# Patient Record
Sex: FEMALE | Race: WHITE | NOT HISPANIC OR LATINO | Employment: OTHER | URBAN - METROPOLITAN AREA
[De-identification: names, ages, dates, MRNs, and addresses within clinical notes are randomized per-mention and may not be internally consistent; named-entity substitution may affect disease eponyms.]

---

## 2019-02-28 RX ORDER — DICYCLOMINE HYDROCHLORIDE 20 MG/1
TABLET ORAL
Qty: 30 TABLET | Refills: 0 | Status: SHIPPED | OUTPATIENT
Start: 2019-02-28 | End: 2019-04-10 | Stop reason: SDUPTHER

## 2019-04-10 RX ORDER — GLIPIZIDE 10 MG/1
TABLET ORAL
Qty: 60 TABLET | Refills: 3 | Status: SHIPPED | OUTPATIENT
Start: 2019-04-10 | End: 2019-12-15 | Stop reason: SDUPTHER

## 2019-04-10 RX ORDER — DICYCLOMINE HYDROCHLORIDE 20 MG/1
TABLET ORAL
Qty: 30 TABLET | Refills: 0 | Status: SHIPPED | OUTPATIENT
Start: 2019-04-10 | End: 2019-05-30 | Stop reason: SDUPTHER

## 2019-04-25 RX ORDER — OLMESARTAN MEDOXOMIL 40 MG/1
40 TABLET ORAL DAILY
Qty: 90 TABLET | Refills: 3 | Status: SHIPPED | OUTPATIENT
Start: 2019-04-25 | End: 2023-11-06

## 2019-04-25 RX ORDER — HYDROCHLOROTHIAZIDE 25 MG/1
25 TABLET ORAL DAILY
Qty: 30 TABLET | Refills: 11 | Status: SHIPPED | OUTPATIENT
Start: 2019-04-25 | End: 2023-11-06

## 2019-04-25 RX ORDER — OLMESARTAN MEDOXOMIL AND HYDROCHLOROTHIAZIDE 40/25 40; 25 MG/1; MG/1
1 TABLET ORAL DAILY
Qty: 90 TABLET | Refills: 3 | Status: SHIPPED | OUTPATIENT
Start: 2019-04-25 | End: 2019-04-25

## 2019-05-06 DIAGNOSIS — Z79.899 LONG TERM USE OF DRUG: Primary | ICD-10-CM

## 2019-05-06 DIAGNOSIS — K52.9 CHRONIC DIARRHEA: Primary | ICD-10-CM

## 2019-05-07 ENCOUNTER — OFFICE VISIT (OUTPATIENT)
Dept: FAMILY MEDICINE | Facility: CLINIC | Age: 54
End: 2019-05-07
Payer: COMMERCIAL

## 2019-05-07 VITALS
SYSTOLIC BLOOD PRESSURE: 123 MMHG | BODY MASS INDEX: 38.09 KG/M2 | DIASTOLIC BLOOD PRESSURE: 73 MMHG | RESPIRATION RATE: 18 BRPM | HEART RATE: 86 BPM | WEIGHT: 237 LBS | OXYGEN SATURATION: 98 % | HEIGHT: 66 IN

## 2019-05-07 DIAGNOSIS — Z79.4 TYPE 2 DIABETES MELLITUS WITHOUT COMPLICATION, WITH LONG-TERM CURRENT USE OF INSULIN: ICD-10-CM

## 2019-05-07 DIAGNOSIS — E11.9 TYPE 2 DIABETES MELLITUS WITHOUT COMPLICATION, WITH LONG-TERM CURRENT USE OF INSULIN: ICD-10-CM

## 2019-05-07 DIAGNOSIS — K90.9 INTESTINAL MALABSORPTION, UNSPECIFIED TYPE: ICD-10-CM

## 2019-05-07 DIAGNOSIS — K52.9 CHRONIC DIARRHEA: ICD-10-CM

## 2019-05-07 DIAGNOSIS — I10 BENIGN ESSENTIAL HTN: ICD-10-CM

## 2019-05-07 DIAGNOSIS — Z71.89 ENCOUNTER FOR MEDICATION REVIEW AND COUNSELING: Primary | ICD-10-CM

## 2019-05-07 LAB
ABS NRBC COUNT: 0 X 10 3/UL (ref 0–0.01)
ABSOLUTE BASOPHIL: 0.07 X 10 3/UL (ref 0–0.22)
ABSOLUTE EOSINOPHIL: 0.18 X 10 3/UL (ref 0.04–0.54)
ABSOLUTE IMMATURE GRAN: 0.03 X 10 3/UL (ref 0–0.04)
ABSOLUTE LYMPHOCYTE: 2.32 X 10 3/UL (ref 0.86–4.75)
ABSOLUTE MONOCYTE: 0.52 X 10 3/UL (ref 0.22–1.08)
ALBUMIN SERPL-MCNC: 4.4 G/DL (ref 3.5–5.2)
ALBUMIN/GLOB SERPL ELPH: 1.4 {RATIO} (ref 1–2.7)
ALP ISOS SERPL LEV INH-CCNC: 97 IU/L (ref 35–105)
ALT (SGPT): 38 U/L (ref 0–33)
ANION GAP SERPL CALC-SCNC: 13 MMOL/L (ref 8–17)
AST SERPL-CCNC: 31 U/L (ref 0–32)
BASOPHILS NFR BLD: 1 %
BILIRUBIN, TOTAL: 0.68 MG/DL (ref 0–1.2)
BUN/CREAT SERPL: 17.8 (ref 6–20)
CALCIUM SERPL-MCNC: 10 MG/DL (ref 8.6–10.2)
CARBON DIOXIDE, CO2: 26 MMOL/L (ref 22–29)
CHLORIDE: 101 MMOL/L (ref 98–107)
CREAT SERPL-MCNC: 0.95 MG/DL (ref 0.5–0.9)
EOSINOPHIL NFR BLD: 2.5 %
ESTIMATED AVERAGE GLUCOSE: 218 MG/DL
GFR ESTIMATION: 61.77
GLOBULIN: 3.2 G/DL (ref 1.5–4.5)
GLUCOSE: 326 MG/DL (ref 74–106)
HBA1C MFR BLD: 9.2 % (ref 4–6)
HCT VFR BLD AUTO: 42.5 % (ref 37–47)
HGB BLD-MCNC: 13.8 G/DL (ref 12–16)
IMMATURE GRANULOCYTES: 0.4 % (ref 0–0.5)
LYMPHOCYTES NFR BLD: 32.3 %
Lab: NORMAL
MCH RBC QN AUTO: 29.5 PG (ref 27–32)
MCHC RBC AUTO-ENTMCNC: 32.5 G/DL (ref 32–36)
MCV RBC AUTO: 90.8 FL (ref 82–100)
MONOCYTES NFR BLD: 7.2 %
NEUTROPHILS ABSOLUTE COUNT: 4.06 X 10 3/UL (ref 2.15–7.56)
NEUTROPHILS NFR BLD: 56.6 %
NUCLEATED RED BLOOD CELLS: 0 /100 WBC (ref 0–0.2)
PLATELET # BLD AUTO: 305 X 10 3/UL (ref 135–400)
POTASSIUM: 5.2 MMOL/L (ref 3.5–5.1)
PROT SNV-MCNC: 7.6 G/DL (ref 6.4–8.3)
RBC # BLD AUTO: 4.68 X 10 6/UL (ref 4.2–5.4)
RDW-SD: 45.2 FL (ref 37–54)
SODIUM: 140 MMOL/L (ref 136–145)
UREA NITROGEN (BUN): 16.9 MG/DL (ref 6–20)
WBC # BLD: 7.18 X 10 3/UL (ref 4.3–10.8)

## 2019-05-07 PROCEDURE — 99215 OFFICE O/P EST HI 40 MIN: CPT | Mod: S$GLB,,, | Performed by: NURSE PRACTITIONER

## 2019-05-07 PROCEDURE — 99215 PR OFFICE/OUTPT VISIT, EST, LEVL V, 40-54 MIN: ICD-10-PCS | Mod: S$GLB,,, | Performed by: NURSE PRACTITIONER

## 2019-05-07 RX ORDER — VALSARTAN 320 MG/1
TABLET ORAL
Refills: 3 | COMMUNITY
Start: 2019-05-03

## 2019-05-07 RX ORDER — ASPIRIN 81 MG/1
TABLET ORAL
COMMUNITY

## 2019-05-07 RX ORDER — ATORVASTATIN CALCIUM 40 MG/1
TABLET, FILM COATED ORAL
COMMUNITY

## 2019-05-07 RX ORDER — PROMETHAZINE HYDROCHLORIDE 12.5 MG/1
TABLET ORAL
Refills: 2 | COMMUNITY
Start: 2019-04-28 | End: 2019-06-25 | Stop reason: SDUPTHER

## 2019-05-07 RX ORDER — ASPIRIN 325 MG
TABLET, DELAYED RELEASE (ENTERIC COATED) ORAL
COMMUNITY
End: 2023-11-06

## 2019-05-07 RX ORDER — INSULIN GLARGINE 100 [IU]/ML
INJECTION, SOLUTION SUBCUTANEOUS
Refills: 3 | COMMUNITY
Start: 2019-02-28 | End: 2023-11-06

## 2019-05-07 RX ORDER — ALPRAZOLAM 0.5 MG/1
TABLET ORAL
Refills: 5 | COMMUNITY
Start: 2019-04-25

## 2019-05-07 RX ORDER — GABAPENTIN 300 MG/1
CAPSULE ORAL
Refills: 11 | COMMUNITY
Start: 2019-04-25

## 2019-05-07 RX ORDER — ABALOPARATIDE 2000 UG/ML
INJECTION, SOLUTION SUBCUTANEOUS
Refills: 11 | COMMUNITY
Start: 2019-03-19 | End: 2023-11-06

## 2019-05-07 RX ORDER — ESOMEPRAZOLE MAGNESIUM 40 MG/1
CAPSULE, DELAYED RELEASE ORAL
Refills: 1 | COMMUNITY
Start: 2019-03-08

## 2019-05-07 NOTE — PROGRESS NOTES
Subjective:      Patient ID: Leandra Kelley is a 53 y.o. female.    Chief Complaint: Follow-up (3mn. Did labs/stool this AM at [facility]. )      Here for chronic diarrhea. Reports still having foul, greasy stools intermittently. Requesting workup for pancreatic insufficiency. She has been researching her symptoms and requested a stool order prior to coming in today. She is followed by Dr. Ward for this. She is no longer taking colestipol d/t some insurance issues and dose timing.  She tried IBGard in the past as well. The colestipol did help when she was taking it. Denies bloody stools, black/tarry stools, fever, chills. She reports eating only once per day 2/t being afraid to eat.     Her 2nd issue is glucose control.  She states that she was unable to afford Lantus secondary to insurance related issues.  She is no longer on Invokana.  She was also unable to afford Januvia.  The only oral medication she is on currently is glipizide 10 mg b.i.d.  patient states that she is currently giving herself 30 units of Lantus in the a.m.  Her fasting a.m. readings have been between 200-230 despite titrating her AM Lantus dose.     In regards to her blood pressure medication, she is currently taking valsartan.  Her blood pressure is now under control.  She was reportedly having trouble affording medications secondary to insurance.  Numerous medications were called out on her behalf but insurance would not cover.  She denies side effects from the medication.       She reports no acute issues today.       ROS:   Review of Systems   Constitutional: Negative.    HENT: Negative.    Eyes: Negative.    Respiratory: Negative.    Cardiovascular: Negative.    Gastrointestinal: Positive for abdominal distention and diarrhea (chronic). Negative for abdominal pain, anal bleeding, blood in stool, constipation, nausea, rectal pain and vomiting.   Endocrine: Negative.    Genitourinary: Negative.    Musculoskeletal: Negative.    Skin:  Negative.    Allergic/Immunologic: Negative.    Neurological: Negative.    Hematological: Negative.    Psychiatric/Behavioral: Negative.    All other systems reviewed and are negative.    Objective:   Physical Exam   Constitutional: She is oriented to person, place, and time. She appears well-developed and well-nourished.   Eyes: Pupils are equal, round, and reactive to light.   Cardiovascular: Normal rate.   Pulmonary/Chest: Effort normal.   Musculoskeletal: She exhibits no edema.   Neurological: She is alert and oriented to person, place, and time.   Skin: Skin is warm and dry.   Psychiatric: She has a normal mood and affect. Her behavior is normal. Judgment and thought content normal.   Nursing note and vitals reviewed.    Assessment:     1. Encounter for medication review and counseling    2. Benign essential HTN    3. Type 2 diabetes mellitus without complication, with long-term current use of insulin    4. Chronic diarrhea    5. Intestinal malabsorption, unspecified type      Plan:     Problem List Items Addressed This Visit        Cardiac/Vascular    Benign essential HTN    Current Assessment & Plan     Controlled on Valsartan            Endocrine    DM (diabetes mellitus), type 2    Overview     On asa 81mg and ACE inhibitor. Currently on sulfonylurea, DPP4, Thiazolidiniones, and SGLT2 inhibitor. She was on metformin at some point. Reports no side effects from it. Unsure as to why she was taken off.     Tried Victoza in the past. Gave her belching issues. Unable to do this r/t GB removal    Oseni, GLipizide 5 mg, Invokana.    HgBA1C 8.1 on 2/18.     6/18 - A1C 7.4 currently. Will continue meds for now. Discussed this with pt and family.     7/19 - insulin elevated. Will start Metformin topical. RX sent to [facility] pharmacy. Instructed her to DC OSENI d/t risk of weight gain/fractures. She is already at increased risk d/t malabsorption. Will start Januvia since Alogliptin isnt covered. Will reduce Invokana at  later date d/t CKD.     10/17 - A1C 9.7. On Januvia 100mg, Metformin topical BID, Glipizide 5mg, and Invokana 300mg. Insurance has been a deterant to changing meds. increase to Glipizide 10mg bid    1/22/19 - Currently on januvia 100, invokana 300, and glipizide 10mg bid 2/t insurance.   1/25 - A1C 11.3. has been on meds only 4 weeks total. She was given the option to continue medications and recheck in 3 months or discontinue Invokana and start Lantus titration schedule. The patient discussed the options with her  and her daughter and elected to start Lantus per titration schedule as above.     If she is unable to afford Lantus, she will continue the medications and notify our office on Monday. We will then repeat A1c in 3 mts. Labs prior to visit.          Current Assessment & Plan     Was unable to afford Januvia.  Currently off of Invokana.     Currently on Lantus titration schedule. On 30 U q AM.  Still having fasting readings of 230s despite this.      On Glipizide 10mg BID.     Will change Lantus to BID dosing. Start with 20 U in AM and 10 U PM. See below for reference.     LANTUS TITRATION   Check fasting blood sugar every morning upon awakening and every evening before meals.  Titrate EVENING dose of Lantus based on algorithm.  Bring a daily log for the next two weeks to your visit.        FASTING AM SUGAR GOAL = 100-150     FOR FASTING AM READINGS ABOVE 150   INCREASE LANTUS BY 2 UNITS EVERY OTHER DAY UNTIL TARGET RANGE IS ACHIEVED. TARGET RANGE IS LESS THAN 150.     FOR FASTING AM READING BELOW 100   DECREASE LANTUS DOSE BY 2 UNITS EVERY OTHER DAY UNTIL TARGET RANGE IS ACHIEVED.  TARGET RANGE IS GREATER THAN 100.          Relevant Medications    LANTUS SOLOSTAR U-100 INSULIN glargine 100 units/mL (3mL) SubQ pen       GI    Chronic diarrhea    Overview     + occult blood    Followed by Dr. Ward.     Suspect her diarrhea is r/t GB removal (bile continuously spilling into intestines).     KAREN  negative.     Lactoferrin +, fecal fat +, EAEC E. coli + (treated), EPEC E. Coli + (treated).         Current Assessment & Plan     Will add Stool panel.  Specifically Pancreatic Elastase at her request. IF this comes back negative, would work her up for Gastroparesis at her request.     Other working diagnosis include SIBO.     She has a missing gallbladder and chronic gastritis per Colonoscopy. Explained pathology of bile acid/GB.               Relevant Orders    Stool culture    Occult blood x 1, stool    Stool Exam-Ova,Cysts,Parasites    WBC, Stool    Clostridium difficile EIA    Fecal fat, qualitative    Malabsorption    Overview     Neg Celiac.          Current Assessment & Plan     Monitor for deficiencies of the fat soluble vitamins A, D, E, and K.      Already has B12 def.            Other Visit Diagnoses     Encounter for medication review and counseling    -  Primary    All medications (Lantus included) were discussed with Pt, , and daughter.         Spent 15-20 min discussing Colonoscopy reports and multiple consultations during the visit.

## 2019-05-07 NOTE — ASSESSMENT & PLAN NOTE
Was unable to afford Januvia.  Currently off of Invokana.     Currently on Lantus titration schedule. On 30 U q AM.  Still having fasting readings of 230s despite this.      On Glipizide 10mg BID.     Will change Lantus to BID dosing. Start with 20 U in AM and 10 U PM. See below for reference.     LANTUS TITRATION   Check fasting blood sugar every morning upon awakening and every evening before meals.  Titrate EVENING dose of Lantus based on algorithm.  Bring a daily log for the next two weeks to your visit.        FASTING AM SUGAR GOAL = 100-150     FOR FASTING AM READINGS ABOVE 150   INCREASE LANTUS BY 2 UNITS EVERY OTHER DAY UNTIL TARGET RANGE IS ACHIEVED. TARGET RANGE IS LESS THAN 150.     FOR FASTING AM READING BELOW 100   DECREASE LANTUS DOSE BY 2 UNITS EVERY OTHER DAY UNTIL TARGET RANGE IS ACHIEVED.  TARGET RANGE IS GREATER THAN 100.

## 2019-05-07 NOTE — ASSESSMENT & PLAN NOTE
Will add Stool panel.  Specifically Pancreatic Elastase at her request. IF this comes back negative, would work her up for Gastroparesis at her request.     Other working diagnosis include SIBO.     She has a missing gallbladder and chronic gastritis per Colonoscopy. Explained pathology of bile acid/GB.

## 2019-05-10 LAB — PANCREATIC ELASTASE, FECAL: 497 UG ELAST./G

## 2019-05-14 ENCOUNTER — TELEPHONE (OUTPATIENT)
Dept: FAMILY MEDICINE | Facility: CLINIC | Age: 54
End: 2019-05-14

## 2019-05-14 DIAGNOSIS — K52.9 CHRONIC DIARRHEA: Primary | ICD-10-CM

## 2019-05-14 NOTE — TELEPHONE ENCOUNTER
----- Message from Snony Brantley NP sent at 5/14/2019  8:31 AM CDT -----  Can let her know that she does not have pancreatic insufficiency.  Stool level appropriate.  Other stool tests pending. Will discuss those at her follow up.  Also let her know her A1C is 9.2.  Her sugar was 326 the day she checked it.  See how her sugars are doing with the Lantus adjustment. Tell her to record her BS log twice daily for her follow up visit.

## 2019-05-14 NOTE — TELEPHONE ENCOUNTER
Januvia Oral: 100 mg once daily. Can take before or after meal. Its not a twice daily medication.      In regards to EPI... I will do one more lab for her. If she doesn't provide a sample that's adequate then TERRIE what to tell her.  Its very unlikely she has that.

## 2019-05-14 NOTE — TELEPHONE ENCOUNTER
B/S one morning was 197. Doing Lantus adjustment and taking Januvia 1 in AM, 1 in PM. Should she take Januvia before checking sugar or other way around?     Also th stool she gave wasn't a watery stool, so she feels she may need to do add. test b/c she feels she has EPI.

## 2019-05-21 ENCOUNTER — OFFICE VISIT (OUTPATIENT)
Dept: FAMILY MEDICINE | Facility: CLINIC | Age: 54
End: 2019-05-21
Payer: COMMERCIAL

## 2019-05-21 VITALS
SYSTOLIC BLOOD PRESSURE: 142 MMHG | BODY MASS INDEX: 39.54 KG/M2 | WEIGHT: 245 LBS | OXYGEN SATURATION: 96 % | DIASTOLIC BLOOD PRESSURE: 88 MMHG | TEMPERATURE: 98 F | RESPIRATION RATE: 14 BRPM | HEART RATE: 78 BPM

## 2019-05-21 DIAGNOSIS — Z79.4 TYPE 2 DIABETES MELLITUS WITHOUT COMPLICATION, WITH LONG-TERM CURRENT USE OF INSULIN: ICD-10-CM

## 2019-05-21 DIAGNOSIS — K52.9 CHRONIC DIARRHEA: Primary | ICD-10-CM

## 2019-05-21 DIAGNOSIS — E11.9 TYPE 2 DIABETES MELLITUS WITHOUT COMPLICATION, WITH LONG-TERM CURRENT USE OF INSULIN: ICD-10-CM

## 2019-05-21 PROCEDURE — 99214 OFFICE O/P EST MOD 30 MIN: CPT | Mod: S$GLB,,, | Performed by: NURSE PRACTITIONER

## 2019-05-21 PROCEDURE — 99214 PR OFFICE/OUTPT VISIT, EST, LEVL IV, 30-39 MIN: ICD-10-PCS | Mod: S$GLB,,, | Performed by: NURSE PRACTITIONER

## 2019-05-21 NOTE — ASSESSMENT & PLAN NOTE
She states that she was unable to afford Lantus secondary to insurance related issues.  She is no longer on Invokana.  She was also unable to afford Januvia.  The only oral medication she is on currently is glipizide 10 mg b.i.d. Currently on BID Lantus and titration regimen above.         A1C currently 9.2.  This is due to inability to afford meds. Recheck in 3 mts.  Adjust meds at that time. Will defer f/u to 6 mts given her current issue with limited visits and funds. Will make changes over the phone.

## 2019-05-21 NOTE — PROGRESS NOTES
Subjective:      Patient ID: Leandra Kelley is a 53 y.o. female.    Chief Complaint: Follow-up (2 Week Follow up/ chronic diarrhea, glucose control)      Patient is here for follow-up regarding type 2 diabetes and chronic diarrhea.  She is currently titrating her Lantus per the algorithm given to her during her last encounter.  She is currently at 20 units in the a.m. and 20 units in the p.m. denies hypoglycemia.  Checking blood sugars b.i.d. reportedly almost at target range.  Her diet fluctuates however.       Head regards to her chronic diarrhea, she states that she continues to have bouts of steatorrhea and diarrhea.  She is here to discuss recent stool specimen.  There been no acute changes in the above. Reports still having foul, greasy stools intermittently.  She is followed by Dr. Ward for this. She is no longer taking colestipol d/t some insurance issues and dose timing.  She tried IBGard in the past as well. The colestipol did help when she was taking it. Denies bloody stools, black/tarry stools, fever, chills. + intermittent nausea. Denies vomiting.       ROS:   Review of Systems   Constitutional: Negative.    HENT: Negative.    Eyes: Negative.    Respiratory: Negative.    Cardiovascular: Negative.    Gastrointestinal: Positive for abdominal distention and diarrhea (chronic). Negative for abdominal pain, anal bleeding, blood in stool, constipation, nausea, rectal pain and vomiting.   Endocrine: Negative.    Genitourinary: Negative.    Musculoskeletal: Negative.    Skin: Negative.    Allergic/Immunologic: Negative.    Neurological: Negative.    Hematological: Negative.    Psychiatric/Behavioral: Negative.    All other systems reviewed and are negative.    Objective:   Physical Exam   Constitutional: She is oriented to person, place, and time. She appears well-developed and well-nourished.   Eyes: Pupils are equal, round, and reactive to light.   Cardiovascular: Normal rate.   Pulmonary/Chest: Effort  normal.   Musculoskeletal: She exhibits no edema.   Neurological: She is alert and oriented to person, place, and time.   Skin: Skin is warm and dry.   Psychiatric: She has a normal mood and affect. Her behavior is normal. Judgment and thought content normal.   Nursing note and vitals reviewed.    Assessment:     1. Chronic diarrhea    2. Type 2 diabetes mellitus without complication, with long-term current use of insulin      Plan:     Problem List Items Addressed This Visit        Endocrine    DM (diabetes mellitus), type 2    Overview     On asa 81mg and ACE inhibitor. Currently on sulfonylurea, DPP4, Thiazolidiniones, and SGLT2 inhibitor. She was on metformin at some point. Reports no side effects from it. Unsure as to why she was taken off.     Tried Victoza in the past. Gave her belching issues. Unable to do this r/t GB removal    Oseni, GLipizide 5 mg, Invokana.    HgBA1C 8.1 on 2/18.     6/18 - A1C 7.4 currently. Will continue meds for now. Discussed this with pt and family.     7/19 - insulin elevated. Will start Metformin topical. RX sent to [facility] pharmacy. Instructed her to DC OSENI d/t risk of weight gain/fractures. She is already at increased risk d/t malabsorption. Will start Januvia since Alogliptin isnt covered. Will reduce Invokana at later date d/t CKD.     10/17 - A1C 9.7. On Januvia 100mg, Metformin topical BID, Glipizide 5mg, and Invokana 300mg. Insurance has been a deterant to changing meds. increase to Glipizide 10mg bid    1/22/19 - Currently on januvia 100, invokana 300, and glipizide 10mg bid 2/t insurance.   1/25 - A1C 11.3. has been on meds only 4 weeks total. She was given the option to continue medications and recheck in 3 months or discontinue Invokana and start Lantus titration schedule. The patient discussed the options with her  and her daughter and elected to start Lantus per titration schedule as above.     If she is unable to afford Lantus, she will continue the medications  and notify our office on Monday. We will then repeat A1c in 3 mts. Labs prior to visit.     5/21/19    Will change Lantus to BID dosing. Start with 20 U in AM and 10 U PM. See below for reference.      LANTUS TITRATION   Check fasting blood sugar every morning upon awakening and every evening before meals.  Titrate EVENING dose of Lantus based on algorithm.  Bring a daily log for the next two weeks to your visit.          FASTING AM SUGAR GOAL = 100-150      FOR FASTING AM READINGS ABOVE 150   INCREASE LANTUS BY 2 UNITS EVERY OTHER DAY UNTIL TARGET RANGE IS ACHIEVED. TARGET RANGE IS LESS THAN 150.      FOR FASTING AM READING BELOW 100   DECREASE LANTUS DOSE BY 2 UNITS EVERY OTHER DAY UNTIL TARGET RANGE IS ACHIEVED.  TARGET RANGE IS GREATER THAN 100.             Relevant Medications                   Current Assessment & Plan     She states that she was unable to afford Lantus secondary to insurance related issues.  She is no longer on Invokana.  She was also unable to afford Januvia.  The only oral medication she is on currently is glipizide 10 mg b.i.d. Currently on BID Lantus and titration regimen above.         A1C currently 9.2.  This is due to inability to afford meds. Recheck in 3 mts.  Adjust meds at that time. Will defer f/u to 6 mts given her current issue with limited visits and funds. Will make changes over the phone.          Relevant Orders    CBC auto differential    Hemoglobin A1c    Comprehensive metabolic panel    Ambulatory referral to Gastroenterology       GI    Chronic diarrhea - Primary    Overview     + occult blood    Followed by Dr. Ward.     Suspect her diarrhea is r/t GB removal (bile continuously spilling into intestines).     KAREN negative.     Lactoferrin +, fecal fat +, EAEC E. coli + (treated), EPEC E. Coli + (treated).         Current Assessment & Plan     Pancreatic Elastase was unremarkable.     Advise f/u with Gastroenterology for more specific testing (direct pancreatic  function) if she is still having steatorrhea. Will refer back to Dr. Ward for further workup.  She would like to be tested for diabetic gastroparesis. Again, will defer to GI.     Referral sent.          Relevant Orders    Ambulatory referral to Gastroenterology

## 2019-05-21 NOTE — ASSESSMENT & PLAN NOTE
Pancreatic Elastase was unremarkable.     Advise f/u with Gastroenterology for more specific testing (direct pancreatic function) if she is still having steatorrhea. Will refer back to Dr. Ward for further workup.  She would like to be tested for diabetic gastroparesis. Again, will defer to GI.     Referral sent.

## 2019-05-31 RX ORDER — DICYCLOMINE HYDROCHLORIDE 20 MG/1
TABLET ORAL
Qty: 30 TABLET | Refills: 5 | Status: SHIPPED | OUTPATIENT
Start: 2019-05-31 | End: 2020-02-25

## 2019-06-25 RX ORDER — PROMETHAZINE HYDROCHLORIDE 12.5 MG/1
TABLET ORAL
Qty: 30 TABLET | Refills: 3 | Status: SHIPPED | OUTPATIENT
Start: 2019-06-25

## 2019-09-11 RX ORDER — CYANOCOBALAMIN 1000 UG/ML
INJECTION, SOLUTION INTRAMUSCULAR; SUBCUTANEOUS
Qty: 10 ML | Refills: 5 | Status: SHIPPED | OUTPATIENT
Start: 2019-09-11 | End: 2023-11-06

## 2019-12-16 RX ORDER — GLIPIZIDE 10 MG/1
TABLET ORAL
Qty: 60 TABLET | Refills: 5 | Status: SHIPPED | OUTPATIENT
Start: 2019-12-16 | End: 2019-12-30

## 2019-12-17 ENCOUNTER — TELEPHONE (OUTPATIENT)
Dept: FAMILY MEDICINE | Facility: CLINIC | Age: 54
End: 2019-12-17

## 2019-12-17 DIAGNOSIS — Z79.4 TYPE 2 DIABETES MELLITUS WITHOUT COMPLICATION, WITH LONG-TERM CURRENT USE OF INSULIN: Primary | ICD-10-CM

## 2019-12-17 DIAGNOSIS — E11.9 TYPE 2 DIABETES MELLITUS WITHOUT COMPLICATION, WITH LONG-TERM CURRENT USE OF INSULIN: Primary | ICD-10-CM

## 2019-12-17 DIAGNOSIS — Z79.899 LONG TERM USE OF DRUG: ICD-10-CM

## 2019-12-30 RX ORDER — GLIPIZIDE 10 MG/1
TABLET ORAL
Qty: 60 TABLET | Refills: 5 | Status: SHIPPED | OUTPATIENT
Start: 2019-12-30

## 2020-02-25 RX ORDER — DICYCLOMINE HYDROCHLORIDE 20 MG/1
TABLET ORAL
Qty: 30 TABLET | Refills: 0 | Status: SHIPPED | OUTPATIENT
Start: 2020-02-25

## 2021-04-07 ENCOUNTER — PATIENT OUTREACH (OUTPATIENT)
Dept: OBSTETRICS AND GYNECOLOGY | Facility: CLINIC | Age: 56
End: 2021-04-07

## 2021-04-26 ENCOUNTER — PATIENT OUTREACH (OUTPATIENT)
Dept: ADMINISTRATIVE | Facility: HOSPITAL | Age: 56
End: 2021-04-26

## 2022-02-14 ENCOUNTER — TELEPHONE (OUTPATIENT)
Dept: FAMILY MEDICINE | Facility: CLINIC | Age: 57
End: 2022-02-14

## 2023-09-28 ENCOUNTER — TELEPHONE (OUTPATIENT)
Dept: GASTROENTEROLOGY | Facility: CLINIC | Age: 58
End: 2023-09-28
Payer: COMMERCIAL

## 2023-09-28 DIAGNOSIS — R10.12 LEFT UPPER QUADRANT PAIN: Primary | ICD-10-CM

## 2023-09-28 DIAGNOSIS — Z85.038 HISTORY OF COLON CANCER: ICD-10-CM

## 2023-09-28 DIAGNOSIS — R10.9 ABDOMINAL PAIN: ICD-10-CM

## 2023-09-28 NOTE — TELEPHONE ENCOUNTER
Seen by [facility] in the past ([facility] chart).    NPs are also with me during my clinic. Okay to schedule next available OV with me or NP.  LUCINDA

## 2023-09-28 NOTE — TELEPHONE ENCOUNTER
----- Message from Allison Alvarez LPN sent at 9/28/2023  9:35 AM CDT -----  Contact: pt daughter - kaila    ----- Message -----  From: Juan Diego Cordova  Sent: 9/28/2023   9:27 AM CDT  To: [facility]    Dr Ronald Leonard sent in a referral for the pt to be seen '    Type:  Sooner Apoointment Request    Caller is requesting a sooner appointment.  Caller declined first available appointment listed below.  Caller will not accept being placed on the waitlist and is requesting a message be sent to doctor.  Name of Caller:   When is the first available appointment? 03/2024  Symptoms:  history of abdominal cancers/ incontinence /  Would the patient rather a call back or a response via [facility]? phone  Best Call Back Number: [phone]  Additional Information:

## 2023-10-19 ENCOUNTER — TELEPHONE (OUTPATIENT)
Dept: GASTROENTEROLOGY | Facility: CLINIC | Age: 58
End: 2023-10-19
Payer: COMMERCIAL

## 2023-10-19 NOTE — TELEPHONE ENCOUNTER
----- Message from Bertha Mazariegos sent at 10/19/2023  8:45 AM CDT -----  Contact: Eun/daughter  Eun needs a call back at [phone], Regards to getting a sooner appointment.    Thanks  Td

## 2023-10-19 NOTE — TELEPHONE ENCOUNTER
Returned dtr's call, she needed to change her mom's appt, previous date does not work. Appt R/S with TRAM Hernandez for 11-6-23.

## 2023-11-03 NOTE — PROGRESS NOTES
"Clinic Note    Reason for visit:  The primary encounter diagnosis was Left upper quadrant pain. Diagnoses of Diarrhea, unspecified type, Gastroesophageal reflux disease, unspecified whether esophagitis present, Incontinence of feces, unspecified fecal incontinence type, Gastroparesis, Fatty liver, History of colon polyps, and History of colon cancer were also pertinent to this visit.    PCP: No primary care provider on file.   [address]  / [place] [address]    HPI:  This is a 57 y.o. female who here to be established. Previously established with Dr. Ward. Referred for LUQ pain. Patient with gastroparesis, chronic diarrhea- responded well to BAS previosulsy. She is having Bm 3-7 times a day. She has days where she will go >25 times day. She will have fecal incontinence often with these episodes. She takes welchol once daily. Denies rectal bleeding. She is having intermittent LUQ pain. Describes as soreness that is worse at the end of the day. On Nexium 40mg daily which controls his reflux. She does have episodes of N/V occasionally. Maybe every 2-3 months with "egg-smelling" belching. Attributes to gastroparesis.    Presents with daughter.    H/o of large serrated adenoma requiring surgical resection in 2016. 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    9/15/2023 CT A/P W: Normal-peter, hepatic steatosis, normal pancreas, diverticula.    9/10/2019 GES 4hr GES at lower limit normal (89.8%-4hour)    Colonoscopy 4/9/2017 SC hyperplastic, anastomosis, chronic active inflammation/benign mucosa - repeat 5 years.     Review of Systems   Constitutional:  Negative for fatigue, fever and unexpected weight change.   HENT:  Negative for mouth sores, postnasal drip, sore throat and trouble swallowing.    Eyes:  Negative for pain, discharge and eye dryness.   Respiratory:  Negative for apnea, cough, choking, chest tightness, shortness of breath and wheezing.    Cardiovascular:  Negative for " chest pain, palpitations and leg swelling.   Gastrointestinal:  Positive for abdominal distention, diarrhea, nausea and reflux. Negative for abdominal pain, anal bleeding, blood in stool, change in bowel habit, constipation, rectal pain, vomiting and fecal incontinence.   Genitourinary:  Negative for bladder incontinence, dysuria and hematuria.   Musculoskeletal:  Negative for arthralgias, back pain and joint swelling.   Integumentary:  Negative for color change and rash.   Allergic/Immunologic: Negative for environmental allergies and food allergies.   Neurological:  Negative for seizures and headaches.   Hematological:  Negative for adenopathy. Bruises/bleeds easily.        Past Medical History:   Diagnosis Date    Anxiety disorder     Benign essential HTN     Chronic diarrhea     CVA (cerebral vascular accident)     DM (diabetes mellitus), type 2     Endometriosis     History of colon cancer     Hypercalcemia     Hyperlipidemia     IBS (irritable bowel syndrome)     Left inguinal hernia     Obstructive sleep apnea     Osteoporosis     RLS (restless legs syndrome)     Steatosis of liver     TIA (transient ischemic attack)     last     Vitamin D deficiency      Past Surgical History:   Procedure Laterality Date     SECTION      CHOLECYSTECTOMY      dr apodaca    RIGHT HEMICOLECTOMY  2016     Family History   Problem Relation Age of Onset    Diabetes Mother     Heart disease Father      Social History     Tobacco Use    Smoking status: Never    Smokeless tobacco: Never   Substance Use Topics    Alcohol use: Never    Drug use: Never     Review of patient's allergies indicates:   Allergen Reactions    Adhesive Rash     Cause skin to break out in rash and really bad bleeding bumps      Medication List with Changes/Refills   New Medications    POLYETHYLENE GLYCOL (GOLYTELY) 236-22.74-6.74 -5.86 GRAM SUSPENSION    Take 4,000 mLs (4 L total) by mouth once. for 1 dose   Current Medications    ALPRAZOLAM  "(XANAX) 0.5 MG TABLET    TK 1 T PO Q 12 H PRN    ASPIRIN (ASPIR-81) 81 MG EC TABLET    Aspir-81    ATORVASTATIN (LIPITOR) 40 MG TABLET    atorvastatin 40 mg tablet    COLESEVELAM (WELCHOL) 625 MG TABLET    Take 1,875 mg by mouth 2 (two) times daily with meals.    DICYCLOMINE (BENTYL) 20 MG TABLET    TAKE 1 TABLET BY MOUTH FOUR TIMES A DAY AS NEEDED FOR ABDOMINAL CRAMPS    ESOMEPRAZOLE (NEXIUM) 40 MG CAPSULE    TK ONE C BY MOUTH BID    GABAPENTIN (NEURONTIN) 300 MG CAPSULE    TK ONE C PO  HS    GABAPENTIN (NEURONTIN) 600 MG TABLET    Take 1 tablet by mouth every evening.    GLIPIZIDE (GLUCOTROL) 10 MG TABLET    TAKE 1 TABLET BY MOUTH TWICE DAILY    HYDROCHLOROTHIAZIDE (HYDRODIURIL) 25 MG TABLET    Take 1 tablet (25 mg total) by mouth once daily.    OZEMPIC 0.25 MG OR 0.5 MG (2 MG/3 ML) PEN INJECTOR    Inject 0.25 mg into the skin once daily.    PIOGLITAZONE (ACTOS) 45 MG TABLET    Take 1 tablet by mouth once daily.    PROMETHAZINE (PHENERGAN) 12.5 MG TAB    TAKE 1 TABLET BY MOUTH FOUR TIMES DAILY AS NEEDED FOR NAUSEA AND VOMITING    VALSARTAN (DIOVAN) 320 MG TABLET    TK 1 T PO QD   Discontinued Medications    CHOLECALCIFEROL, VITAMIN D3, (DECARA) 50,000 UNIT CAPSULE    Decara 50,000 unit capsule   TK 1 C PO 1 TIME A WK    CYANOCOBALAMIN 1,000 MCG/ML INJECTION    INJECT 1 ML IN THE MUSCLE EVERY MONTH    CYANOCOBALAMIN, VITAMIN B-12, (B-12 COMPLIANCE INJ)    B12   1000mg pink tab 1 qd    LANTUS SOLOSTAR U-100 INSULIN GLARGINE 100 UNITS/ML (3ML) SUBQ PEN    INJECT 20 UNITS SQ  QAM    OLMESARTAN (BENICAR) 40 MG TABLET    Take 1 tablet (40 mg total) by mouth once daily.    TYMLOS 80 MCG (3,120 MCG/1.56 ML) PNIJ    INJECT 80 MCG UNDER THE SKIN EVERY DAY UTD         Vital Signs:  BP (!) 171/97 (BP Location: Right arm, Patient Position: Sitting, BP Method: Large (Automatic)) Comment: No meds taken  Pulse 76   Temp 98.2 °F (36.8 °C) (Oral)   Resp 18   Ht 5' 3" (1.6 m)   Wt 106.1 kg (234 lb)   SpO2 96%   BMI 41.45 " kg/m²        Physical Exam  Vitals reviewed.   Constitutional:       General: She is awake. She is not in acute distress.     Appearance: Normal appearance. She is well-developed. She is obese. She is not ill-appearing, toxic-appearing or diaphoretic.      Comments: W/ walker   HENT:      Head: Normocephalic and atraumatic.      Nose: Nose normal.      Mouth/Throat:      Mouth: Mucous membranes are moist.      Pharynx: Oropharynx is clear. No oropharyngeal exudate or posterior oropharyngeal erythema.   Eyes:      General: Lids are normal. Gaze aligned appropriately. No scleral icterus.        Right eye: No discharge.         Left eye: No discharge.      Conjunctiva/sclera: Conjunctivae normal.   Neck:      Trachea: Trachea normal.   Cardiovascular:      Rate and Rhythm: Normal rate and regular rhythm.      Pulses:           Radial pulses are 2+ on the right side and 2+ on the left side.   Pulmonary:      Effort: Pulmonary effort is normal. No respiratory distress.      Breath sounds: No stridor. No wheezing.   Chest:      Chest wall: No tenderness.   Abdominal:      General: Bowel sounds are normal. There is no distension.      Palpations: Abdomen is soft. There is no fluid wave, hepatomegaly or mass.      Tenderness: There is no abdominal tenderness. There is no guarding or rebound.   Musculoskeletal:         General: No tenderness or deformity.      Cervical back: Full passive range of motion without pain and neck supple. No tenderness.      Right lower leg: No edema.      Left lower leg: No edema.   Lymphadenopathy:      Cervical: No cervical adenopathy.   Skin:     General: Skin is warm and dry.      Capillary Refill: Capillary refill takes less than 2 seconds.      Coloration: Skin is not cyanotic, jaundiced or pale.   Neurological:      General: No focal deficit present.      Mental Status: She is alert and oriented to person, place, and time.      Motor: No tremor.   Psychiatric:         Attention and  Perception: Attention normal.         Mood and Affect: Mood and affect normal.         Speech: Speech normal.         Behavior: Behavior normal. Behavior is cooperative.            All of the data above and below has been reviewed by myself and any further interpretations will be reflected in the assessment and plan.   The data includes review of external notes, and independent interpretation of lab results, procedures, x-rays, and imaging reports.      Assessment:  Left upper quadrant pain  -     Ambulatory referral/consult to Gastroenterology  -     Ambulatory Referral to External Surgery    Diarrhea, unspecified type  -     Ambulatory Referral to External Surgery  -     polyethylene glycol (GOLYTELY) 236-22.74-6.74 -5.86 gram suspension; Take 4,000 mLs (4 L total) by mouth once. for 1 dose  Dispense: 4000 mL; Refill: 0  -     Fecal fat, qualitative; Future; Expected date: 11/06/2023  -     Clostridium difficile EIA; Future; Expected date: 11/06/2023  -     Calprotectin, Stool; Future; Expected date: 11/06/2023  -     Pancreatic elastase, fecal; Future; Expected date: 11/06/2023  -     Stool Exam-Ova,Cysts,Parasites; Future; Expected date: 11/06/2023    Gastroesophageal reflux disease, unspecified whether esophagitis present    Incontinence of feces, unspecified fecal incontinence type    Gastroparesis    Fatty liver  -     Ambulatory referral/consult to Gastroenterology    History of colon polyps    History of colon cancer  Comments:  2016 s/p R hemicolectomy  Orders:  -     Ambulatory referral/consult to Gastroenterology  -     Ambulatory Referral to External Surgery    Overdue for colonoscopy. Will obtain RCB and Dbx on EGD as well as stool tests- Celiac v infection v malabsorption v EPI v colitis v BAD v IBS.  Will increase colesevalam to twice daily. If still with symptoms and negative stool tests- consider xifaxan.     Recommendations:    Schedule upper and lower endoscopies with Dr. Twin Alonso  colesevelam to twice daily.  Complete stool tests.    Risks, benefits, and alternatives of medical management, any associated procedures, and/or treatment discussed with the patient. Patient given opportunity to ask questions and voices understanding. Patient has elected to proceed with the recommended care modalities as discussed.    Follow up in about 6 months (around 5/6/2024).    Order summary:  Orders Placed This Encounter   Procedures    Clostridium difficile EIA    Fecal fat, qualitative    Calprotectin, Stool    Pancreatic elastase, fecal    Stool Exam-Ova,Cysts,Parasites    Ambulatory Referral to External Surgery        Instructed patient to notify my office if they have not been contacted within two weeks after any procedures, submitting any samples (biopsies, blood, stool, urine, etc.) or after any imaging (X-ray, CT, MRI, etc.).      Mary Hamilton NP    This document may have been created using a voice recognition transcribing system. Incorrect words or phrases may have been missed during proofreading. Please interpret accordingly or contact me for clarification.

## 2023-11-06 ENCOUNTER — OFFICE VISIT (OUTPATIENT)
Dept: GASTROENTEROLOGY | Facility: CLINIC | Age: 58
End: 2023-11-06
Payer: COMMERCIAL

## 2023-11-06 VITALS
WEIGHT: 234 LBS | HEIGHT: 63 IN | TEMPERATURE: 98 F | OXYGEN SATURATION: 96 % | BODY MASS INDEX: 41.46 KG/M2 | RESPIRATION RATE: 18 BRPM | DIASTOLIC BLOOD PRESSURE: 97 MMHG | HEART RATE: 76 BPM | SYSTOLIC BLOOD PRESSURE: 171 MMHG

## 2023-11-06 DIAGNOSIS — R15.9 INCONTINENCE OF FECES, UNSPECIFIED FECAL INCONTINENCE TYPE: ICD-10-CM

## 2023-11-06 DIAGNOSIS — Z85.038 HISTORY OF COLON CANCER: ICD-10-CM

## 2023-11-06 DIAGNOSIS — R19.7 DIARRHEA, UNSPECIFIED TYPE: ICD-10-CM

## 2023-11-06 DIAGNOSIS — Z86.010 HISTORY OF COLON POLYPS: ICD-10-CM

## 2023-11-06 DIAGNOSIS — R10.12 LEFT UPPER QUADRANT PAIN: Primary | ICD-10-CM

## 2023-11-06 DIAGNOSIS — K21.9 GASTROESOPHAGEAL REFLUX DISEASE, UNSPECIFIED WHETHER ESOPHAGITIS PRESENT: ICD-10-CM

## 2023-11-06 DIAGNOSIS — K31.84 GASTROPARESIS: ICD-10-CM

## 2023-11-06 DIAGNOSIS — K76.0 FATTY LIVER: ICD-10-CM

## 2023-11-06 PROCEDURE — 99205 OFFICE O/P NEW HI 60 MIN: CPT | Mod: S$GLB,,, | Performed by: NURSE PRACTITIONER

## 2023-11-06 PROCEDURE — 99205 PR OFFICE/OUTPT VISIT, NEW, LEVL V, 60-74 MIN: ICD-10-PCS | Mod: S$GLB,,, | Performed by: NURSE PRACTITIONER

## 2023-11-06 RX ORDER — SEMAGLUTIDE 0.68 MG/ML
0.25 INJECTION, SOLUTION SUBCUTANEOUS DAILY
COMMUNITY
Start: 2023-10-17

## 2023-11-06 RX ORDER — GABAPENTIN 600 MG/1
1 TABLET ORAL NIGHTLY
COMMUNITY
Start: 2023-09-23

## 2023-11-06 RX ORDER — COLESEVELAM 180 1/1
1875 TABLET ORAL 2 TIMES DAILY WITH MEALS
COMMUNITY

## 2023-11-06 RX ORDER — POLYETHYLENE GLYCOL 3350, SODIUM SULFATE ANHYDROUS, SODIUM BICARBONATE, SODIUM CHLORIDE, POTASSIUM CHLORIDE 236; 22.74; 6.74; 5.86; 2.97 G/4L; G/4L; G/4L; G/4L; G/4L
4 POWDER, FOR SOLUTION ORAL ONCE
Qty: 4000 ML | Refills: 0 | Status: SHIPPED | OUTPATIENT
Start: 2023-11-06 | End: 2023-11-06

## 2023-11-06 RX ORDER — PIOGLITAZONEHYDROCHLORIDE 45 MG/1
1 TABLET ORAL DAILY
COMMUNITY
Start: 2023-09-25

## 2023-11-06 NOTE — PATIENT INSTRUCTIONS
Schedule upper and lower endoscopies with Dr. Murcia  Increase colesevelam to twice daily.  Complete stool tests.    Please notify my office if you have not been contacted within two weeks after any procedures, submitting any samples (biopsies, blood, stool, urine, etc.) or after any imaging (X-ray, CT, MRI, etc.).

## 2024-02-22 ENCOUNTER — TELEPHONE (OUTPATIENT)
Dept: GASTROENTEROLOGY | Facility: CLINIC | Age: 59
End: 2024-02-22
Payer: COMMERCIAL

## 2024-02-22 VITALS — BODY MASS INDEX: 41.46 KG/M2 | WEIGHT: 234 LBS | HEIGHT: 63 IN

## 2024-02-22 DIAGNOSIS — R10.12 LEFT UPPER QUADRANT PAIN: Primary | ICD-10-CM

## 2024-02-22 DIAGNOSIS — Z85.038 HISTORY OF COLON CANCER: ICD-10-CM

## 2024-02-22 DIAGNOSIS — R19.7 DIARRHEA, UNSPECIFIED TYPE: ICD-10-CM

## 2024-02-22 NOTE — TELEPHONE ENCOUNTER
"[facility]  [address] [place] [address]  Phone: [phone]  Fax: [phone]    History & Physical         Provider: Dr. Neida Murcia    Patient Name: Leandra PARIKH (age):1965  58 y.o.           Gender: female   Phone: [phone]     Referring Physician: No primary care provider on file.     Vital Signs:   Height - 5' 3"  Weight - 234 lb  BMI -  41.45    Plan: EGD/Colonoscopy @ [facility]    Encounter Diagnoses   Name Primary?    Left upper quadrant pain Yes    History of colon cancer     Diarrhea, unspecified type            History:      Past Medical History:   Diagnosis Date    Anxiety disorder     Benign essential HTN     Chronic diarrhea     CVA (cerebral vascular accident)     DM (diabetes mellitus), type 2     Endometriosis     History of colon cancer     Hypercalcemia     Hyperlipidemia     IBS (irritable bowel syndrome)     Left inguinal hernia     Obstructive sleep apnea     Osteoporosis     RLS (restless legs syndrome)     Steatosis of liver     TIA (transient ischemic attack)     last     Vitamin D deficiency       Past Surgical History:   Procedure Laterality Date     SECTION      CHOLECYSTECTOMY      dr apodaca    RIGHT HEMICOLECTOMY        Medication List with Changes/Refills   Current Medications    ALPRAZOLAM (XANAX) 0.5 MG TABLET    TK 1 T PO Q 12 H PRN    ASPIRIN (ASPIR-81) 81 MG EC TABLET    Aspir-81    ATORVASTATIN (LIPITOR) 40 MG TABLET    atorvastatin 40 mg tablet    COLESEVELAM (WELCHOL) 625 MG TABLET    Take 1,875 mg by mouth 2 (two) times daily with meals.    DICYCLOMINE (BENTYL) 20 MG TABLET    TAKE 1 TABLET BY MOUTH FOUR TIMES A DAY AS NEEDED FOR ABDOMINAL CRAMPS    ESOMEPRAZOLE (NEXIUM) 40 MG CAPSULE    TK ONE C BY MOUTH BID    GABAPENTIN (NEURONTIN) 300 MG CAPSULE    TK ONE C PO  HS    GABAPENTIN (NEURONTIN) 600 MG TABLET    Take 1 tablet by " mouth every evening.    GLIPIZIDE (GLUCOTROL) 10 MG TABLET    TAKE 1 TABLET BY MOUTH TWICE DAILY    HYDROCHLOROTHIAZIDE (HYDRODIURIL) 25 MG TABLET    Take 1 tablet (25 mg total) by mouth once daily.    OZEMPIC 0.25 MG OR 0.5 MG (2 MG/3 ML) PEN INJECTOR    Inject 0.25 mg into the skin once daily.    PIOGLITAZONE (ACTOS) 45 MG TABLET    Take 1 tablet by mouth once daily.    PROMETHAZINE (PHENERGAN) 12.5 MG TAB    TAKE 1 TABLET BY MOUTH FOUR TIMES DAILY AS NEEDED FOR NAUSEA AND VOMITING    VALSARTAN (DIOVAN) 320 MG TABLET    TK 1 T PO QD      Review of patient's allergies indicates:   Allergen Reactions    Adhesive Rash     Cause skin to break out in rash and really bad bleeding bumps      Family History   Problem Relation Age of Onset    Diabetes Mother     Heart disease Father       Social History     Tobacco Use    Smoking status: Never    Smokeless tobacco: Never   Substance Use Topics    Alcohol use: Never    Drug use: Never        Physical Examination:     General Appearance:___________________________  HEENT: _____________________________________  Abdomen:____________________________________  Heart:________________________________________  Lungs:_______________________________________  Extremities:___________________________________  Skin:_________________________________________  Endocrine:____________________________________  Genitourinary:_________________________________  Neurological:__________________________________      Patient has been evaluated immediately prior to sedation and is medically cleared for endoscopy with IVCS as an ASA class: ______      Physician Signature: _________________________       Date: ________  Time: ________

## 2024-02-22 NOTE — TELEPHONE ENCOUNTER
S/w pt and told her that I was calling as a courtesy regarding her upcoming EGD/Colon with NBP on 2/28/24, Wed and wanted to verify that pt has prep instructions & meds. Pt stated she still need to pickup her GoLytely.  I also mentioned that [facility] will call on Tues 27th with the arrival time, GI lab is located on the third floor, and to pre-register this week. bettie

## 2024-02-28 ENCOUNTER — OUTSIDE PLACE OF SERVICE (OUTPATIENT)
Dept: GASTROENTEROLOGY | Facility: CLINIC | Age: 59
End: 2024-02-28

## 2024-02-28 LAB — CRC RECOMMENDATION EXT: NORMAL

## 2024-02-28 PROCEDURE — 45385 COLONOSCOPY W/LESION REMOVAL: CPT | Mod: ,,, | Performed by: INTERNAL MEDICINE

## 2024-02-28 PROCEDURE — 43239 EGD BIOPSY SINGLE/MULTIPLE: CPT | Mod: 51,,, | Performed by: INTERNAL MEDICINE

## 2024-03-05 ENCOUNTER — TELEPHONE (OUTPATIENT)
Dept: GASTROENTEROLOGY | Facility: CLINIC | Age: 59
End: 2024-03-05
Payer: COMMERCIAL

## 2024-03-05 DIAGNOSIS — K29.80 DUODENITIS DETERMINED BY BIOPSY: Primary | ICD-10-CM

## 2024-03-06 NOTE — TELEPHONE ENCOUNTER
DBx non-sp ditis w/focal villi blunting, GBx wnl w/o Hp, 1 TA, 2 hyp, repeat colonoscopy in 3 years.     Notify patient that her colon polyps were benign. Repeat colonoscopy in 3 years. Stomach Bx were benign. Her small bowel Bx showed some inflammaiton. Rec blood test to evaluate for celiac disease. Orders signed. Make 1 month follow up OV with JUDE. Confirm recall tab in appointment desk is up-to-date (update if needed).  NBP

## 2024-03-07 NOTE — TELEPHONE ENCOUNTER
Call and spoke with patient daughter and gave her the results from procedure also sent her lab orders to path lab , also verified that her follow up was in the recall tab and message was sent to Irma to schedule a 1 month follow up visit for patient with Mary rios

## 2024-03-12 LAB
ABS NRBC COUNT: 0 X 10 3/UL (ref 0–0.01)
ABSOLUTE BASOPHIL: 0.13 X 10 3/UL (ref 0–0.22)
ABSOLUTE EOSINOPHIL: 0.18 X 10 3/UL (ref 0.04–0.54)
ABSOLUTE IMMATURE GRAN: 0.03 X 10 3/UL (ref 0–0.04)
ABSOLUTE LYMPHOCYTE: 2.34 X 10 3/UL (ref 0.86–4.75)
ABSOLUTE MONOCYTE: 0.44 X 10 3/UL (ref 0.22–1.08)
ALBUMIN SERPL-MCNC: 4 G/DL (ref 3.5–5.2)
ALBUMIN/GLOB SERPL ELPH: 1 {RATIO} (ref 1–2.7)
ALP ISOS SERPL LEV INH-CCNC: 117 U/L (ref 35–105)
ALT (SGPT): 22 U/L (ref 0–33)
ANION GAP SERPL CALC-SCNC: 15 MMOL/L (ref 8–17)
AST SERPL-CCNC: 20 U/L (ref 0–32)
BASOPHILS NFR BLD: 1.6 % (ref 0.2–1.2)
BILIRUBIN, TOTAL: 1.14 MG/DL (ref 0–1.2)
BUN/CREAT SERPL: 14 (ref 6–20)
CALCIUM SERPL-MCNC: 9.6 MG/DL (ref 8.6–10.2)
CARBON DIOXIDE, CO2: 21 MMOL/L (ref 22–29)
CHLORIDE: 98 MMOL/L (ref 98–107)
CREAT SERPL-MCNC: 0.85 MG/DL (ref 0.5–0.9)
EOSINOPHIL NFR BLD: 2.2 % (ref 0.7–7)
GFR ESTIMATION: 79.86 ML/MIN/1.73M2
GLIADIN AB IGA, DEAMINATED: 2.1 U/ML
GLIADIN AB IGG, DEAMINATED: <0.6 U/ML
GLOBULIN: 3.9 G/DL (ref 1.5–4.5)
GLUCOSE: 332 MG/DL (ref 74–106)
HCT VFR BLD AUTO: 46 % (ref 37–47)
HGB BLD-MCNC: 15.2 G/DL (ref 12–16)
IGA SERPL-MCNC: 591 MG/DL (ref 70–400)
IMMATURE GRANULOCYTES: 0.4 % (ref 0–0.5)
LYMPHOCYTES NFR BLD: 28.5 % (ref 19.3–53.1)
MCH RBC QN AUTO: 29.3 PG (ref 27–32)
MCHC RBC AUTO-ENTMCNC: 33 G/DL (ref 32–36)
MCV RBC AUTO: 88.8 FL (ref 82–100)
MONOCYTES NFR BLD: 5.4 % (ref 4.7–12.5)
NEUTROPHILS # BLD AUTO: 5.08 X 10 3/UL (ref 2.15–7.56)
NEUTROPHILS NFR BLD: 61.9 % (ref 34–71.1)
NUCLEATED RED BLOOD CELLS: 0 /100 WBC (ref 0–0.2)
PLATELET # BLD AUTO: 341 X 10 3/UL (ref 135–400)
POTASSIUM: 3.8 MMOL/L (ref 3.5–5.1)
PROT SNV-MCNC: 7.9 G/DL (ref 6.4–8.3)
RBC # BLD AUTO: 5.18 X 10 6/UL (ref 4.2–5.4)
RDW-SD: 44.7 FL (ref 37–54)
SODIUM: 134 MMOL/L (ref 136–145)
TSH W/REFLEX TO FT4: 1.37 UIU/ML (ref 0.27–4.2)
TTG IGA: 1.2 U/ML
UREA NITROGEN (BUN): 11.9 MG/DL (ref 6–20)
WBC # BLD: 8.2 X 10 3/UL (ref 4.3–10.8)

## 2024-03-22 LAB — ENDOMYSIAL IGA SCREEN: NEGATIVE

## 2024-03-24 ENCOUNTER — TELEPHONE (OUTPATIENT)
Dept: GASTROENTEROLOGY | Facility: CLINIC | Age: 59
End: 2024-03-24
Payer: COMMERCIAL

## 2024-03-25 ENCOUNTER — TELEPHONE (OUTPATIENT)
Dept: GASTROENTEROLOGY | Facility: CLINIC | Age: 59
End: 2024-03-25

## 2024-03-25 NOTE — TELEPHONE ENCOUNTER
20/22/117H/1.14, CMP ownl, CBC/celiac/TSH nl.    Notify patient that one of her liver tests was mildly elevated. Her blood counts and thyroid level looked well. Her glucose level was high. She was to have a follow up OV set with JUDE next month but I do not see that completed. Make follow up OV with JUDE early 4/2024.  NBP

## 2024-03-25 NOTE — TELEPHONE ENCOUNTER
Returned patients call and got her kristine for 4/10/24 @ 8:20. Patient is aware of date and time. 3/25/24 LRA       ----- Message from Meron Basurto sent at 3/25/2024  1:29 PM CDT -----  Contact: pt latia  Pt latia Haq returning a missed call for her mother and she can be reached at [phone]     Thanks,

## 2024-03-26 ENCOUNTER — DOCUMENTATION ONLY (OUTPATIENT)
Dept: GASTROENTEROLOGY | Facility: CLINIC | Age: 59
End: 2024-03-26
Payer: COMMERCIAL

## 2024-03-28 NOTE — TELEPHONE ENCOUNTER
Lvm for pt. Attempted to contact daughter - a male answered and would not tell me his name. No results provided. -kg

## 2024-04-01 ENCOUNTER — TELEPHONE (OUTPATIENT)
Dept: GASTROENTEROLOGY | Facility: CLINIC | Age: 59
End: 2024-04-01
Payer: COMMERCIAL

## 2024-04-01 NOTE — TELEPHONE ENCOUNTER
Patient has apt set up for April 10, 2024. 4/1/24 LRA       ----- Message from Meron Basurto sent at 4/1/2024  8:43 AM CDT -----  Contact: pt  Pt returning a missed call and can be reached at  [phone]     Thanks,

## 2024-04-01 NOTE — TELEPHONE ENCOUNTER
Lvm to speak to daughter. Called both phone numbers and only one could I leave a voicemail on. 4/1/24 LRA

## 2024-04-09 ENCOUNTER — TELEPHONE (OUTPATIENT)
Dept: GASTROENTEROLOGY | Facility: CLINIC | Age: 59
End: 2024-04-09
Payer: COMMERCIAL

## 2024-04-09 NOTE — TELEPHONE ENCOUNTER
Called and spoke with daughter she states that she needs to pick her mom results up its better if she sees them so I did look on [facility] paper and I did see that the daughter was listed so I printed them out for her to  and to schedule a follow up for her  kdc

## 2024-04-09 NOTE — TELEPHONE ENCOUNTER
----- Message from Carolina Coburn sent at 4/9/2024 12:32 PM CDT -----  Contact: Eun/daughter  Pt daughter is calling in regards to the pt test results.please call back at .[phone]         Thanks  SAMANTHA

## 2024-04-19 NOTE — PROGRESS NOTES
Clinic Note    Reason for visit:  The primary encounter diagnosis was Gastroparesis. Diagnoses of Diarrhea, unspecified type, Fatty liver, Duodenitis determined by biopsy, History of colon polyps, and History of colon cancer were also pertinent to this visit.    PCP: Marta Leonard       HPI:  This is a 58 y.o. female here for follow up. Patient has gastroparesis, chronic diarrhea. Presents with . Last OV her colevelam was increased to twice daily. She is not sure if she increased to twice daily. Her daughter takes care of her medication but will check when she gets home. She states her diarrhea is not better. Still with diarrhea that worsens with meals. Did go on gluten free diet for about 3 weeks and did have formed stool at that time. Stool tests were ordered last visit not completed. She eats 1 small meal a day. Still with early satiety, bloating. Has been trying to eat healthier. Does admit to eating raw vegetables often in her effort to eat better. Was started on Ozempic about 6 months ago. Does not feel like this worsened her GI symptoms. She has lost 20 pounds in 2 months. Attributes to eating less.    3/12/2024 Glucose 332H, 20/22/117H/1.14, CMP ownl, CBC/celiac/TSH nl.   1/12/2024 Hgb A1C 11.3%H    EGD/Colonoscopy 2/28/2024 small HH, DBx non-sp ditis w/focal villi blunting, GBx wnl w/o Hp, 1 TA, 2 hyp, diverticulosis repeat colonoscopy in 3 years.      9/15/2023 CT A/P W: Normal-peter, hepatic steatosis, normal pancreas, diverticula.  9/10/2019 GES 4hr GES at lower limit normal (89.8%-4hour)    H/o of large serrated adenoma requiring surgical resection in 2016. 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       Review of Systems   Constitutional:  Negative for fatigue, fever and unexpected weight change.   HENT:  Negative for mouth sores, postnasal drip, sore throat and trouble swallowing.    Eyes:  Negative for pain, discharge and eye dryness.   Respiratory:  Negative for apnea,  cough, choking, chest tightness, shortness of breath and wheezing.    Cardiovascular:  Negative for chest pain, palpitations and leg swelling.   Gastrointestinal:  Negative for abdominal distention, abdominal pain, anal bleeding, blood in stool, change in bowel habit, constipation, diarrhea, nausea, rectal pain, vomiting, reflux and fecal incontinence.   Genitourinary:  Negative for bladder incontinence, dysuria and hematuria.   Musculoskeletal:  Negative for arthralgias, back pain and joint swelling.   Integumentary:  Negative for color change and rash.   Allergic/Immunologic: Negative for environmental allergies and food allergies.   Neurological:  Negative for seizures and headaches.   Hematological:  Negative for adenopathy. Does not bruise/bleed easily.        Past Medical History:   Diagnosis Date    Anxiety disorder     Benign essential HTN     Chronic diarrhea     CVA (cerebral vascular accident)     DM (diabetes mellitus), type 2     Endometriosis     History of colon cancer     Hypercalcemia     Hyperlipidemia     IBS (irritable bowel syndrome)     Left inguinal hernia     Obstructive sleep apnea     Osteoporosis     RLS (restless legs syndrome)     Steatosis of liver     TIA (transient ischemic attack)     last     Vitamin D deficiency      Past Surgical History:   Procedure Laterality Date     SECTION      CHOLECYSTECTOMY      dr apodaca    RIGHT HEMICOLECTOMY  2016     Family History   Problem Relation Name Age of Onset    Diabetes Mother      Heart disease Father      Colon cancer Neg Hx      Esophageal cancer Neg Hx      Rectal cancer Neg Hx      Stomach cancer Neg Hx      Ulcerative colitis Neg Hx      Crohn's disease Neg Hx       Social History     Tobacco Use    Smoking status: Never    Smokeless tobacco: Never   Substance Use Topics    Alcohol use: Never    Drug use: Never     Review of patient's allergies indicates:   Allergen Reactions    Adhesive Rash     Cause skin to break out in  "rash and really bad bleeding bumps      Medication List with Changes/Refills   Current Medications    ALPRAZOLAM (XANAX) 0.5 MG TABLET    TK 1 T PO Q 12 H PRN    ASPIRIN (ASPIR-81) 81 MG EC TABLET    Aspir-81    ATORVASTATIN (LIPITOR) 40 MG TABLET    atorvastatin 40 mg tablet    COLESEVELAM (WELCHOL) 625 MG TABLET    Take 1,875 mg by mouth 2 (two) times daily with meals.    DICYCLOMINE (BENTYL) 20 MG TABLET    TAKE 1 TABLET BY MOUTH FOUR TIMES A DAY AS NEEDED FOR ABDOMINAL CRAMPS    ESOMEPRAZOLE (NEXIUM) 40 MG CAPSULE    TK ONE C BY MOUTH BID    GABAPENTIN (NEURONTIN) 300 MG CAPSULE    TK ONE C PO  HS    GABAPENTIN (NEURONTIN) 600 MG TABLET    Take 1 tablet by mouth every evening.    GLIPIZIDE (GLUCOTROL) 10 MG TABLET    TAKE 1 TABLET BY MOUTH TWICE DAILY    HYDROCHLOROTHIAZIDE (HYDRODIURIL) 25 MG TABLET    Take 1 tablet (25 mg total) by mouth once daily.    OZEMPIC 0.25 MG OR 0.5 MG (2 MG/3 ML) PEN INJECTOR    Inject 0.25 mg into the skin once daily.    PIOGLITAZONE (ACTOS) 45 MG TABLET    Take 1 tablet by mouth once daily.    PROMETHAZINE (PHENERGAN) 12.5 MG TAB    TAKE 1 TABLET BY MOUTH FOUR TIMES DAILY AS NEEDED FOR NAUSEA AND VOMITING    VALSARTAN (DIOVAN) 320 MG TABLET    TK 1 T PO QD         Vital Signs:  /86 (BP Location: Left arm, Patient Position: Sitting)   Pulse 92   Ht 5' 3" (1.6 m)   Wt 99.5 kg (219 lb 6.4 oz)   SpO2 99%   BMI 38.86 kg/m²        Physical Exam  Vitals reviewed.   Constitutional:       General: She is awake. She is not in acute distress.     Appearance: Normal appearance. She is well-developed. She is not ill-appearing, toxic-appearing or diaphoretic.   HENT:      Head: Normocephalic and atraumatic.      Nose: Nose normal.      Mouth/Throat:      Mouth: Mucous membranes are moist.      Pharynx: Oropharynx is clear. No oropharyngeal exudate or posterior oropharyngeal erythema.   Eyes:      General: Lids are normal. Gaze aligned appropriately. No scleral icterus.        Right " eye: No discharge.         Left eye: No discharge.      Conjunctiva/sclera: Conjunctivae normal.   Neck:      Trachea: Trachea normal.   Cardiovascular:      Rate and Rhythm: Normal rate and regular rhythm.      Pulses:           Radial pulses are 2+ on the right side and 2+ on the left side.   Pulmonary:      Effort: Pulmonary effort is normal. No respiratory distress.      Breath sounds: No stridor. No wheezing.   Chest:      Chest wall: No tenderness.   Abdominal:      General: Bowel sounds are normal. There is no distension.      Palpations: Abdomen is soft. There is no fluid wave, hepatomegaly or mass.      Tenderness: There is no abdominal tenderness. There is no guarding or rebound.   Musculoskeletal:         General: No tenderness or deformity.      Cervical back: Full passive range of motion without pain and neck supple. No tenderness.      Right lower leg: No edema.      Left lower leg: No edema.   Lymphadenopathy:      Cervical: No cervical adenopathy.   Skin:     General: Skin is warm and dry.      Capillary Refill: Capillary refill takes less than 2 seconds.      Coloration: Skin is not cyanotic, jaundiced or pale.   Neurological:      General: No focal deficit present.      Mental Status: She is alert and oriented to person, place, and time.      Motor: No tremor.   Psychiatric:         Attention and Perception: Attention normal.         Mood and Affect: Mood and affect normal.         Speech: Speech normal.         Behavior: Behavior normal. Behavior is cooperative.            All of the data above and below has been reviewed by myself and any further interpretations will be reflected in the assessment and plan.   The data includes review of external notes, and independent interpretation of lab results, procedures, x-rays, and imaging reports.      Assessment:  Gastroparesis    Diarrhea, unspecified type    Fatty liver    Duodenitis determined by biopsy    History of colon polyps    History of colon  cancer      Submit stool tests. Likely will benefit for digestive enzymes. Will confirm EPI with stool tests. If negative will send out course of Xifaxan. She will let us know if she is taking welchol.   Long conversation with her and  about how poor sugar control may be exacerbating her GI symptoms. Handout given on gastroparesis diet.   Colonoscopy due 2027     Recommendations:    Please let us know if you are taking Colevelam.   Submit stool test.      Risks, benefits, and alternatives of medical management, any associated procedures, and/or treatment discussed with the patient. Patient given opportunity to ask questions and voices understanding. Patient has elected to proceed with the recommended care modalities as discussed.    Follow up in about 4 months (around 8/22/2024).    Order summary:  No orders of the defined types were placed in this encounter.     I spent a total of 48 minutes on the day of the visit.  This includes face to face time and non-face to face time preparing to see the patient (eg, review of tests), obtaining and/or reviewing separately obtained history, documenting clinical information in the electronic or other health record, independently interpreting results and communicating results to the patient/family/caregiver, or care coordinator.     Instructed patient to notify my office if they have not been contacted within two weeks after any procedures, submitting any samples (biopsies, blood, stool, urine, etc.) or after any imaging (X-ray, CT, MRI, etc.).      Mary Hamilton NP    This document may have been created using a voice recognition transcribing system. Incorrect words or phrases may have been missed during proofreading. Please interpret accordingly or contact me for clarification.

## 2024-04-22 ENCOUNTER — OFFICE VISIT (OUTPATIENT)
Dept: GASTROENTEROLOGY | Facility: CLINIC | Age: 59
End: 2024-04-22
Payer: COMMERCIAL

## 2024-04-22 VITALS
HEIGHT: 63 IN | WEIGHT: 219.38 LBS | HEART RATE: 92 BPM | BODY MASS INDEX: 38.87 KG/M2 | OXYGEN SATURATION: 99 % | SYSTOLIC BLOOD PRESSURE: 138 MMHG | DIASTOLIC BLOOD PRESSURE: 86 MMHG

## 2024-04-22 DIAGNOSIS — R19.7 DIARRHEA, UNSPECIFIED TYPE: ICD-10-CM

## 2024-04-22 DIAGNOSIS — Z86.010 HISTORY OF COLON POLYPS: ICD-10-CM

## 2024-04-22 DIAGNOSIS — K29.80 DUODENITIS DETERMINED BY BIOPSY: ICD-10-CM

## 2024-04-22 DIAGNOSIS — K31.84 GASTROPARESIS: Primary | ICD-10-CM

## 2024-04-22 DIAGNOSIS — Z85.038 HISTORY OF COLON CANCER: ICD-10-CM

## 2024-04-22 DIAGNOSIS — K76.0 FATTY LIVER: ICD-10-CM

## 2024-04-22 PROCEDURE — 99215 OFFICE O/P EST HI 40 MIN: CPT | Mod: S$GLB,,, | Performed by: NURSE PRACTITIONER

## 2024-04-22 NOTE — PATIENT INSTRUCTIONS
Please let us know if you are taking Colevelam. And if you are how often are you taking.   Submit stool test.    Please notify my office if you have not been contacted within two weeks after any procedures, submitting any samples (biopsies, blood, stool, urine, etc.) or after any imaging (X-ray, CT, MRI, etc.).

## 2024-05-02 ENCOUNTER — TELEPHONE (OUTPATIENT)
Dept: GASTROENTEROLOGY | Facility: CLINIC | Age: 59
End: 2024-05-02
Payer: COMMERCIAL

## 2024-05-02 DIAGNOSIS — K86.81 EXOCRINE PANCREATIC INSUFFICIENCY: Primary | ICD-10-CM

## 2024-05-02 RX ORDER — PANCRELIPASE 36000; 180000; 114000 [USP'U]/1; [USP'U]/1; [USP'U]/1
CAPSULE, DELAYED RELEASE PELLETS ORAL
Qty: 250 CAPSULE | Refills: 5 | Status: SHIPPED | OUTPATIENT
Start: 2024-05-02

## 2024-05-02 NOTE — TELEPHONE ENCOUNTER
Results discussed with patient per providers chart remarks. Patient voices understanding/agreement. Pt is not taking welchol - states it did not do anything for her.  -Maryuri DOBBS, NEVAEH

## 2024-05-02 NOTE — TELEPHONE ENCOUNTER
4/25/2024 Parasite- neg. Calpro, fat-NL. Elastase 153L    Call with results. Her stool tests were negative for infection, inflammation, malabsorption. It did show that her pancreas is not releasing enzymes for proper digestion. Would recommend pancreatic enzymes. She should take 2 tablets with first bite of each meal and 1 table with each snack (I know she does not eat 3 meals a day so she should take whenever she eats). This should help her diarrhea. Please confirm if she is taking colevelam (welchol). At time of last OV she was not sure if she was taking. If she is, she may discontinue when she starts pancreatic enzymes.  KN   1 2

## 2024-08-09 ENCOUNTER — TELEPHONE (OUTPATIENT)
Dept: GASTROENTEROLOGY | Facility: CLINIC | Age: 59
End: 2024-08-09
Payer: COMMERCIAL

## 2024-08-20 NOTE — PROGRESS NOTES
Clinic Note    Reason for visit:  The primary encounter diagnosis was Exocrine pancreatic insufficiency. Diagnoses of Gastroparesis, Incontinence of feces, unspecified fecal incontinence type, History of colon cancer, and History of colon polyps were also pertinent to this visit.    PCP: Marta Leonard       HPI:  This is a 58 y.o. female here for follow up. Patient with diarrhea. She presents with . She was started on Creon due to EPI which she states improved symptoms initially but now her diarrhea is worsening again. She has memory issues due to CVA and is very forgetful about taking her medication. She states her symptoms are much improved with Creon when she remembers to take it but  states patient still with frequent diarrhea and fecal incontinence. Previously on welchol which did not improve symptoms. She is unable to leave house and go to [employer] because of diarrhea. No abdominal pain. She says her A1C was elevated because was forgetting to take her diabetic medication but has been taking it regularly recently. She forgets to check her BS at home. She is concerned about cancer because she read that EPI can be related to cancer. She has been steadily losing weight since January and since starting Ozempic. Has last 5 pounds in past 4 months. She eats maybe 1 meal a day due to early satiety/gastroparesis and feels well as long as she does this. Was taking 2 pills of creon with this meal and 1 with each snack.    7/9/2024 A1C 11.3H  4/25/2024 Parasite- neg. Calpro, fat-NL. Elastase 153L   3/12/2024 Glucose 332H, 20/22/117H/1.14, CMP ownl, CBC/celiac/TSH nl.   1/12/2024 Hgb A1C 11.3%H     EGD/Colonoscopy 2/28/2024 small HH, DBx non-sp ditis w/focal villi blunting, GBx wnl w/o Hp, 1 TA, 2 hyp, diverticulosis repeat colonoscopy in 3 years.      9/15/2023 CT A/P W: Normal-peter, hepatic steatosis, normal pancreas, diverticula.  9/10/2019 GES 4hr GES at lower limit normal (89.8%-4hour)     H/o of large  serrated adenoma requiring surgical resection in 2016. 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    Review of Systems   Constitutional:  Negative for fatigue, fever and unexpected weight change.   HENT:  Negative for mouth sores, postnasal drip, sore throat and trouble swallowing.    Eyes:  Negative for pain, discharge and eye dryness.   Respiratory:  Positive for apnea. Negative for cough, choking, chest tightness, shortness of breath and wheezing.    Cardiovascular:  Negative for chest pain, palpitations and leg swelling.   Gastrointestinal:  Positive for abdominal distention, diarrhea and nausea. Negative for abdominal pain, anal bleeding, blood in stool, change in bowel habit, constipation, rectal pain, vomiting, reflux and fecal incontinence.   Genitourinary:  Negative for bladder incontinence, dysuria and hematuria.   Musculoskeletal:  Negative for arthralgias, back pain and joint swelling.   Integumentary:  Negative for color change and rash.   Allergic/Immunologic: Negative for environmental allergies and food allergies.   Neurological:  Negative for seizures and headaches.   Hematological:  Negative for adenopathy. Does not bruise/bleed easily.        Past Medical History:   Diagnosis Date    Anxiety disorder     Benign essential HTN     Chronic diarrhea     CVA (cerebral vascular accident)     DM (diabetes mellitus), type 2     Endometriosis     History of colon cancer     Hypercalcemia     Hyperlipidemia     IBS (irritable bowel syndrome)     Left inguinal hernia     Obstructive sleep apnea     Osteoporosis     RLS (restless legs syndrome)     Steatosis of liver     TIA (transient ischemic attack)     last     Vitamin D deficiency      Past Surgical History:   Procedure Laterality Date     SECTION      CHOLECYSTECTOMY      dr apodaca    RIGHT HEMICOLECTOMY  2016     Family History   Problem Relation Name Age of Onset    Diabetes Mother      Heart disease Father      Colon  "cancer Neg Hx      Esophageal cancer Neg Hx      Rectal cancer Neg Hx      Stomach cancer Neg Hx      Ulcerative colitis Neg Hx      Crohn's disease Neg Hx       Social History     Tobacco Use    Smoking status: Never    Smokeless tobacco: Never   Substance Use Topics    Alcohol use: Never    Drug use: Never     Review of patient's allergies indicates:   Allergen Reactions    Adhesive Rash     Cause skin to break out in rash and really bad bleeding bumps      Medication List with Changes/Refills   Current Medications    ALPRAZOLAM (XANAX) 0.5 MG TABLET    TK 1 T PO Q 12 H PRN    ATORVASTATIN (LIPITOR) 40 MG TABLET    atorvastatin 40 mg tablet    COLESEVELAM (WELCHOL) 625 MG TABLET    Take 1,875 mg by mouth 2 (two) times daily with meals.    DICYCLOMINE (BENTYL) 20 MG TABLET    TAKE 1 TABLET BY MOUTH FOUR TIMES A DAY AS NEEDED FOR ABDOMINAL CRAMPS    ESOMEPRAZOLE (NEXIUM) 40 MG CAPSULE    TK ONE C BY MOUTH BID    GABAPENTIN (NEURONTIN) 600 MG TABLET    Take 300 mg by mouth every evening.    GLIPIZIDE (GLUCOTROL) 10 MG TABLET    TAKE 1 TABLET BY MOUTH TWICE DAILY    HYDROCHLOROTHIAZIDE (HYDRODIURIL) 25 MG TABLET    Take 1 tablet (25 mg total) by mouth once daily.    LIPASE-PROTEASE-AMYLASE (CREON) 36,000-114,000- 180,000 UNIT CPDR    Take 2 capsules with first bite/sip of meals, take 1 capsule with first bite/sip of snacks    OZEMPIC 0.25 MG OR 0.5 MG (2 MG/3 ML) PEN INJECTOR    Inject 0.25 mg into the skin once daily.    PIOGLITAZONE (ACTOS) 45 MG TABLET    Take 1 tablet by mouth once daily.    PROMETHAZINE (PHENERGAN) 12.5 MG TAB    TAKE 1 TABLET BY MOUTH FOUR TIMES DAILY AS NEEDED FOR NAUSEA AND VOMITING    VALSARTAN (DIOVAN) 320 MG TABLET    TK 1 T PO QD   Discontinued Medications    ASPIRIN (ASPIR-81) 81 MG EC TABLET    Aspir-81         Vital Signs:  /82 (BP Location: Left arm, Patient Position: Sitting, BP Method: Large (Automatic))   Pulse 86   Resp 16   Ht 5' 3" (1.6 m)   Wt 97.3 kg (214 lb 6.4 oz)  "  SpO2 97%   BMI 37.98 kg/m²        Physical Exam  Vitals reviewed.   Constitutional:       General: She is awake. She is not in acute distress.     Appearance: Normal appearance. She is well-developed. She is not ill-appearing, toxic-appearing or diaphoretic.   HENT:      Head: Normocephalic and atraumatic.      Nose: Nose normal.      Mouth/Throat:      Mouth: Mucous membranes are moist.      Pharynx: Oropharynx is clear. No oropharyngeal exudate or posterior oropharyngeal erythema.   Eyes:      General: Lids are normal. Gaze aligned appropriately. No scleral icterus.        Right eye: No discharge.         Left eye: No discharge.      Conjunctiva/sclera: Conjunctivae normal.   Neck:      Trachea: Trachea normal.   Cardiovascular:      Rate and Rhythm: Normal rate and regular rhythm.      Pulses:           Radial pulses are 2+ on the right side and 2+ on the left side.   Pulmonary:      Effort: Pulmonary effort is normal. No respiratory distress.      Breath sounds: No stridor. No wheezing.   Chest:      Chest wall: No tenderness.   Abdominal:      General: Bowel sounds are normal. There is no distension.      Palpations: Abdomen is soft. There is no fluid wave, hepatomegaly or mass.      Tenderness: There is no abdominal tenderness. There is no guarding or rebound.   Musculoskeletal:         General: No tenderness or deformity.      Cervical back: Full passive range of motion without pain and neck supple. No tenderness.      Right lower leg: No edema.      Left lower leg: No edema.   Lymphadenopathy:      Cervical: No cervical adenopathy.   Skin:     General: Skin is warm and dry.      Capillary Refill: Capillary refill takes less than 2 seconds.      Coloration: Skin is not cyanotic, jaundiced or pale.   Neurological:      General: No focal deficit present.      Mental Status: She is alert and oriented to person, place, and time.      Motor: No tremor.   Psychiatric:         Attention and Perception: Attention  normal.         Mood and Affect: Mood and affect normal.         Speech: Speech normal.         Behavior: Behavior normal. Behavior is cooperative.            All of the data above and below has been reviewed by myself and any further interpretations will be reflected in the assessment and plan.   The data includes review of external notes, and independent interpretation of lab results, procedures, x-rays, and imaging reports.      Assessment:  Exocrine pancreatic insufficiency    Gastroparesis    Incontinence of feces, unspecified fecal incontinence type    History of colon cancer    History of colon polyps      Diarrhea improved with creon. We discussed importance of taking Creon every day with first bite of meals. Will increase Creon to 2 pills with every meal including snack to see if there is improvement in symptoms. Failed BAS but unclear if she was taking correctly. Consider Xifaxan if no improvement.  Long conversation with  and patient about how poorly controlled diabetes is likely contributing to diarrhea and importance of compliance with medication. We also discussed about EPI is likely due to poor glycemic control. Abdominal imaging was negative for and pancreatic lesions/CA. Recent colonoscopy was negative for and Ca. She still would like referral to [facility]. Will discuss with MD.      Recommendations:    Take 2 creon with each meals and snacks.   Ok to take Imodium as needed.      Risks, benefits, and alternatives of medical management, any associated procedures, and/or treatment discussed with the patient. Patient given opportunity to ask questions and voices understanding. Patient has elected to proceed with the recommended care modalities as discussed.    Follow up Keep appointment with Dr. Murcia 4/2025.    Order summary:  No orders of the defined types were placed in this encounter.       Instructed patient to notify my office if they have not been contacted within two weeks after any  procedures, submitting any samples (biopsies, blood, stool, urine, etc.) or after any imaging (X-ray, CT, MRI, etc.).      Mary Hamilton NP    This document may have been created using a voice recognition transcribing system. Incorrect words or phrases may have been missed during proofreading. Please interpret accordingly or contact me for clarification.

## 2024-08-21 ENCOUNTER — OFFICE VISIT (OUTPATIENT)
Dept: GASTROENTEROLOGY | Facility: CLINIC | Age: 59
End: 2024-08-21
Payer: COMMERCIAL

## 2024-08-21 VITALS
OXYGEN SATURATION: 97 % | SYSTOLIC BLOOD PRESSURE: 135 MMHG | HEIGHT: 63 IN | WEIGHT: 214.38 LBS | BODY MASS INDEX: 37.98 KG/M2 | HEART RATE: 86 BPM | RESPIRATION RATE: 16 BRPM | DIASTOLIC BLOOD PRESSURE: 82 MMHG

## 2024-08-21 DIAGNOSIS — K31.84 GASTROPARESIS: ICD-10-CM

## 2024-08-21 DIAGNOSIS — Z86.010 HISTORY OF COLON POLYPS: ICD-10-CM

## 2024-08-21 DIAGNOSIS — Z85.038 HISTORY OF COLON CANCER: ICD-10-CM

## 2024-08-21 DIAGNOSIS — K86.81 EXOCRINE PANCREATIC INSUFFICIENCY: Primary | ICD-10-CM

## 2024-08-21 DIAGNOSIS — R15.9 INCONTINENCE OF FECES, UNSPECIFIED FECAL INCONTINENCE TYPE: ICD-10-CM

## 2024-08-21 PROCEDURE — 99214 OFFICE O/P EST MOD 30 MIN: CPT | Mod: S$GLB,,, | Performed by: NURSE PRACTITIONER

## 2024-08-21 NOTE — PATIENT INSTRUCTIONS
Take 2 creon with each meals and snacks.   Ok to take Imodium as needed.    Please notify my office if you have not been contacted within two weeks after any procedures, submitting any samples (biopsies, blood, stool, urine, etc.) or after any imaging (X-ray, CT, MRI, etc.).

## 2024-08-21 NOTE — Clinical Note
Wants referral to [facility] because she has EPI and h/o colon cancer. I told her I do not think she needs referral but she wanted me to discuss with you.

## 2024-08-23 ENCOUNTER — TELEPHONE (OUTPATIENT)
Dept: GASTROENTEROLOGY | Facility: CLINIC | Age: 59
End: 2024-08-23
Payer: COMMERCIAL

## 2024-08-23 NOTE — TELEPHONE ENCOUNTER
May let patient know that I discussed her case with Dr. Murcia. We sent a referral to [facility] to be evaluated. They should be contacting her to set up appt. Her MRN with [facility] is [id number] (for her records).  I sent the referral through the [facility] website.  RUBENS

## 2024-08-29 NOTE — TELEPHONE ENCOUNTER
Staff spoke to pt.. she advised [facility] called her and she already started the proceedings. ...   Lolita ROQUE notified... lolita

## 2025-01-10 DIAGNOSIS — K86.81 EXOCRINE PANCREATIC INSUFFICIENCY: ICD-10-CM

## 2025-01-10 RX ORDER — PANCRELIPASE 36000; 180000; 114000 [USP'U]/1; [USP'U]/1; [USP'U]/1
CAPSULE, DELAYED RELEASE PELLETS ORAL
Qty: 250 CAPSULE | Refills: 5 | Status: SHIPPED | OUTPATIENT
Start: 2025-01-10

## 2025-03-28 ENCOUNTER — TELEPHONE (OUTPATIENT)
Dept: GASTROENTEROLOGY | Facility: CLINIC | Age: 60
End: 2025-03-28
Payer: COMMERCIAL

## 2025-03-28 NOTE — TELEPHONE ENCOUNTER
Would need clarification with mass on abdomen. Did she go to ER and had scan? Will need records if so.  RUBENS

## 2025-03-28 NOTE — TELEPHONE ENCOUNTER
----- Message from Carmella sent at 3/28/2025 12:27 PM CDT -----  Contact: SAI MANZANARES [[id number]]  ..Type:  Patient Requesting CallWho Called:Tim ( for [facility])Would the patient rather a call back or a response via [facility]? CallBe Call Back Number:.[phone] Information: Caller called to schedule appointment for patient due to patient have a mass on their abdomen.

## 2025-03-31 ENCOUNTER — TELEPHONE (OUTPATIENT)
Dept: GASTROENTEROLOGY | Facility: CLINIC | Age: 60
End: 2025-03-31
Payer: COMMERCIAL

## 2025-03-31 NOTE — TELEPHONE ENCOUNTER
See notes in 3/28/2025 Telephone encounter.  Add on to NP clinic this week. Okay to keep OV with me as is.  NBP

## 2025-04-01 NOTE — PROGRESS NOTES
Clinic Note    Reason for visit:  The primary encounter diagnosis was Exocrine pancreatic insufficiency. Diagnoses of Gastroparesis, Irritable bowel syndrome with diarrhea, Left upper quadrant pain, Palpable abdominal mass, History of colon polyps, and History of colon cancer were also pertinent to this visit.    PCP: Marta Leonard       HPI:  This is a 59 y.o. female here for follow up. Presents with wife. Patient with diarrhea, EPI. On creon. She is havign 5-6 BM daily.  She is still with fecal urgency and incontinence episodes.  When she does take Creon she does notice it does improve her diarrhea.  Takes 2 tablets with meals but does not eat often.  She has been taking Imodium regularly which seems to help.  She previously was on Welchol but did not see any improvement with diarrhea.  Describes as seeing a blood hue after 1 bowel movement.  She recently felt a lump to the left side of her abdomen.  This was new for her.  She still has concerns of cancer.  She was referred to [facility] surveillance clinic last year.  She did not receive a phone call.    She was in the hospital late last year due to CVA concerns.  Could not see out of her left eye.  She still is having vision issues out of left eye.  Is being followed by optometrist.  Has left eye patch on currently      2/14/2025 A1C 10.5H CBC-nl  7/9/2024 A1C 11.3H  4/25/2024 Parasite- neg. Calpro, fat-NL. Elastase 153L   3/12/2024 Glucose 332H, 20/22/117H/1.14, CMP ownl, CBC/celiac/TSH nl.   1/12/2024 Hgb A1C 11.3%H     EGD/Colonoscopy 2/28/2024 small HH, DBx non-sp ditis w/focal villi blunting, GBx wnl w/o Hp, 1 TA, 2 hyp, diverticulosis repeat colonoscopy in 3 years.      9/15/2023 CT A/P W: Normal-peter, hepatic steatosis, normal pancreas, diverticula.  9/10/2019 GES 4hr GES at lower limit normal (89.8%-4hour)     H/o of large serrated adenoma requiring surgical resection in 2016. 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    Review of  Systems   Constitutional:  Negative for fatigue, fever and unexpected weight change.   HENT:  Negative for mouth sores, postnasal drip, sore throat and trouble swallowing.    Eyes:  Negative for pain, discharge and eye dryness.   Respiratory:  Positive for apnea and shortness of breath. Negative for cough, choking, chest tightness and wheezing.    Cardiovascular:  Negative for chest pain, palpitations and leg swelling.   Gastrointestinal:  Positive for abdominal distention, diarrhea, nausea and vomiting. Negative for abdominal pain, anal bleeding, blood in stool, change in bowel habit, constipation, rectal pain, reflux and fecal incontinence.   Genitourinary:  Negative for bladder incontinence, dysuria and hematuria.   Musculoskeletal:  Negative for arthralgias, back pain and joint swelling.   Integumentary:  Negative for color change and rash.   Allergic/Immunologic: Negative for environmental allergies and food allergies.   Neurological:  Negative for seizures and headaches.   Hematological:  Negative for adenopathy. Bruises/bleeds easily.        Past Medical History:   Diagnosis Date    Anxiety disorder     Benign essential HTN     BMI 40.0-44.9, adult     Chronic diarrhea     CVA (cerebral vascular accident)     DM (diabetes mellitus), type 2     Endometriosis     History of colon cancer     Hypercalcemia     Hyperlipidemia     IBS (irritable bowel syndrome)     Left inguinal hernia     Obstructive sleep apnea     Osteoporosis     RLS (restless legs syndrome)     Steatosis of liver     TIA (transient ischemic attack)     last     Vitamin D deficiency      Past Surgical History:   Procedure Laterality Date     SECTION      CHOLECYSTECTOMY      dr [place]    RIGHT HEMICOLECTOMY  2016     Family History   Problem Relation Name Age of Onset    Diabetes Mother      Heart disease Father      Colon cancer Neg Hx      Esophageal cancer Neg Hx      Rectal cancer Neg Hx      Stomach cancer Neg Hx       "Ulcerative colitis Neg Hx      Crohn's disease Neg Hx      Celiac disease Neg Hx       Social History[1]  Review of patient's allergies indicates:   Allergen Reactions    Adhesive Rash     Cause skin to break out in rash and really bad bleeding bumps      Medication List with Changes/Refills   New Medications    RIFAXIMIN (XIFAXAN) 550 MG TAB    Take 1 tablet (550 mg total) by mouth 3 (three) times daily. for 14 days   Current Medications    ALPRAZOLAM (XANAX) 0.5 MG TABLET    TK 1 T PO Q 12 H PRN    ASPIRIN (ECOTRIN) 81 MG EC TABLET    Take 1 tablet by mouth once daily.    ATORVASTATIN (LIPITOR) 40 MG TABLET    atorvastatin 40 mg tablet    COLESEVELAM (WELCHOL) 625 MG TABLET    Take 1,875 mg by mouth 2 (two) times daily with meals.    CYCLOBENZAPRINE (FLEXERIL) 10 MG TABLET    Take 1 tablet by mouth.    DICYCLOMINE (BENTYL) 20 MG TABLET    TAKE 1 TABLET BY MOUTH FOUR TIMES A DAY AS NEEDED FOR ABDOMINAL CRAMPS    ESOMEPRAZOLE (NEXIUM) 40 MG CAPSULE    TK ONE C BY MOUTH BID    GABAPENTIN (NEURONTIN) 600 MG TABLET    Take 300 mg by mouth every evening.    GLIPIZIDE (GLUCOTROL) 10 MG TABLET    TAKE 1 TABLET BY MOUTH TWICE DAILY    HYDROCHLOROTHIAZIDE (HYDRODIURIL) 25 MG TABLET    Take 1 tablet (25 mg total) by mouth once daily.    LIPASE-PROTEASE-AMYLASE (CREON) 36,000-114,000- 180,000 UNIT CPDR    Take 2 capsules with first bite/sip of meals, take 1 capsule with first bite/sip of snacks    PIOGLITAZONE (ACTOS) 45 MG TABLET    Take 1 tablet by mouth once daily.    PROMETHAZINE (PHENERGAN) 12.5 MG TAB    TAKE 1 TABLET BY MOUTH FOUR TIMES DAILY AS NEEDED FOR NAUSEA AND VOMITING    VALSARTAN (DIOVAN) 320 MG TABLET    TK 1 T PO QD   Discontinued Medications    OZEMPIC 0.25 MG OR 0.5 MG (2 MG/3 ML) PEN INJECTOR    Inject 0.25 mg into the skin once daily.         Vital Signs:  /64 (BP Location: Left arm, Patient Position: Sitting)   Pulse 91   Ht 5' 3" (1.6 m)   Wt 108.1 kg (238 lb 6.4 oz)   SpO2 (!) 94%   BMI " 42.23 kg/m²        Physical Exam  Vitals reviewed.   Constitutional:       General: She is awake. She is not in acute distress.     Appearance: Normal appearance. She is well-developed. She is obese. She is not ill-appearing, toxic-appearing or diaphoretic.   HENT:      Head: Normocephalic and atraumatic.      Nose: Nose normal.      Mouth/Throat:      Mouth: Mucous membranes are moist.      Pharynx: Oropharynx is clear. No oropharyngeal exudate or posterior oropharyngeal erythema.   Eyes:      General: Lids are normal. Gaze aligned appropriately. No scleral icterus.        Right eye: No discharge.         Left eye: No discharge.      Conjunctiva/sclera: Conjunctivae normal.      Comments: L eye patch   Neck:      Trachea: Trachea normal.   Cardiovascular:      Rate and Rhythm: Normal rate and regular rhythm.      Pulses:           Radial pulses are 2+ on the right side and 2+ on the left side.   Pulmonary:      Effort: Pulmonary effort is normal. No respiratory distress.      Breath sounds: No stridor. No wheezing.   Chest:      Chest wall: No tenderness.   Abdominal:      General: Bowel sounds are normal. There is no distension.      Palpations: Abdomen is soft. There is no fluid wave, hepatomegaly or mass.      Tenderness: There is no abdominal tenderness. There is no guarding or rebound.          Comments: Soft, round, mobile mass w/o TTP.    Musculoskeletal:         General: No tenderness or deformity.      Cervical back: No tenderness.      Right lower leg: No edema.      Left lower leg: No edema.   Lymphadenopathy:      Cervical: No cervical adenopathy.   Skin:     General: Skin is warm and dry.      Capillary Refill: Capillary refill takes less than 2 seconds.      Coloration: Skin is not cyanotic, jaundiced or pale.   Neurological:      General: No focal deficit present.      Mental Status: She is alert and oriented to person, place, and time.      Motor: No tremor.   Psychiatric:         Attention and  Perception: Attention normal.         Mood and Affect: Mood and affect normal.         Speech: Speech normal.         Behavior: Behavior normal. Behavior is cooperative.            All of the data above and below has been reviewed by myself and any further interpretations will be reflected in the assessment and plan.   The data includes review of external notes, and independent interpretation of lab results, procedures, x-rays, and imaging reports.      Assessment:  Exocrine pancreatic insufficiency    Gastroparesis    Irritable bowel syndrome with diarrhea  -     rifAXIMin (XIFAXAN) 550 mg Tab; Take 1 tablet (550 mg total) by mouth 3 (three) times daily. for 14 days  Dispense: 42 tablet; Refill: 0    Left upper quadrant pain  -     Cancel: CT Abdomen With IV Contrast Routine Oral Contrast; Future  -     CT Abdomen With IV Contrast Routine Oral Contrast; Future    Palpable abdominal mass  -     CT Abdomen With IV Contrast Routine Oral Contrast; Future    History of colon polyps    History of colon cancer       Diarrhea- EPI on Creon and Imodium. Will send out for trial Xifaxan.  LUQ nodule palpated- likely lipoma but will plan for imaging to confirm.   Hx CRC/Hx colon polyps- colonoscopy due 2/2027    Recommendations:    Schedule CT scan.  Start Xifaxan 1 tablet three times daily for 14 days.    If any tests, procedures, or imaging has been ordered and you are not contacted to schedule within 1-2 weeks, then you may call the central scheduling department directly at [phone].     Risks, benefits, and alternatives of medical management, any associated procedures, and/or treatment discussed with the patient. Patient given opportunity to ask questions and voices understanding. Patient has elected to proceed with the recommended care modalities as discussed.    Follow up Keep appointment with Dr. Murcia. Per patient request.    Order summary:  Orders Placed This Encounter   Procedures    CT Abdomen With IV Contrast  Routine Oral Contrast          Instructed patient to notify my office if they have not been contacted within two weeks after any procedures, submitting any samples (biopsies, blood, stool, urine, etc.) or after any imaging (X-ray, CT, MRI, etc.).      Mary Hamilton NP    This document may have been created using a voice recognition transcribing system. Incorrect words or phrases may have been missed during proofreading. Please interpret accordingly or contact me for clarification.          [1]   Social History  Tobacco Use    Smoking status: Never    Smokeless tobacco: Never   Substance Use Topics    Alcohol use: Never    Drug use: Never

## 2025-04-02 ENCOUNTER — OFFICE VISIT (OUTPATIENT)
Dept: GASTROENTEROLOGY | Facility: CLINIC | Age: 60
End: 2025-04-02
Payer: COMMERCIAL

## 2025-04-02 ENCOUNTER — TELEPHONE (OUTPATIENT)
Dept: GASTROENTEROLOGY | Facility: CLINIC | Age: 60
End: 2025-04-02

## 2025-04-02 VITALS
HEIGHT: 63 IN | DIASTOLIC BLOOD PRESSURE: 64 MMHG | HEART RATE: 91 BPM | WEIGHT: 238.38 LBS | BODY MASS INDEX: 42.24 KG/M2 | SYSTOLIC BLOOD PRESSURE: 105 MMHG | OXYGEN SATURATION: 94 %

## 2025-04-02 DIAGNOSIS — R10.12 LEFT UPPER QUADRANT PAIN: ICD-10-CM

## 2025-04-02 DIAGNOSIS — K58.0 IRRITABLE BOWEL SYNDROME WITH DIARRHEA: ICD-10-CM

## 2025-04-02 DIAGNOSIS — R19.00 PALPABLE ABDOMINAL MASS: ICD-10-CM

## 2025-04-02 DIAGNOSIS — K86.81 EXOCRINE PANCREATIC INSUFFICIENCY: Primary | ICD-10-CM

## 2025-04-02 DIAGNOSIS — Z85.038 HISTORY OF COLON CANCER: ICD-10-CM

## 2025-04-02 DIAGNOSIS — K31.84 GASTROPARESIS: ICD-10-CM

## 2025-04-02 DIAGNOSIS — Z86.0100 HISTORY OF COLON POLYPS: ICD-10-CM

## 2025-04-02 PROCEDURE — 99214 OFFICE O/P EST MOD 30 MIN: CPT | Mod: S$PBB,,, | Performed by: NURSE PRACTITIONER

## 2025-04-02 RX ORDER — CYCLOBENZAPRINE HCL 10 MG
1 TABLET ORAL
COMMUNITY

## 2025-04-02 RX ORDER — ASPIRIN 81 MG/1
1 TABLET ORAL DAILY
COMMUNITY

## 2025-04-02 NOTE — TELEPHONE ENCOUNTER
Patients ct order will need pa. Order not faxed. Patient had [employer] insurance. Patient was given AVS with apt details. 4/2/25 LRA

## 2025-04-02 NOTE — PATIENT INSTRUCTIONS
Schedule CT scan.  Start Xifaxan 1 tablet three times daily for 14 days.    If any tests, procedures, or imaging has been ordered and you are not contacted to schedule within 1-2 weeks, then you may call the central scheduling department directly at [phone].   Please notify my office if you have not been contacted within two weeks after any procedures, submitting any samples (biopsies, blood, stool, urine, etc.) or after any imaging (X-ray, CT, MRI, etc.).

## 2025-04-10 ENCOUNTER — RESULTS FOLLOW-UP (OUTPATIENT)
Dept: GASTROENTEROLOGY | Facility: CLINIC | Age: 60
End: 2025-04-10
Payer: COMMERCIAL

## 2025-04-10 DIAGNOSIS — R19.00 PALPABLE ABDOMINAL MASS: Primary | ICD-10-CM

## 2025-04-14 NOTE — PROGRESS NOTES
CT Abd w/ 4/10/2025 fatty liver, peter w/ expected biliary ectasia, fatty atrophy. No suspicious abdominal lymphadenopathy. Small midline ventral fat-containing hernia.

## 2025-04-14 NOTE — TELEPHONE ENCOUNTER
CT Abd w/ 4/10/2025 fatty liver, peter w/ expected biliary ectasia, fatty atrophy. No suspicious abdominal lymphadenopathy. Small midline ventral fat-containing hernia.    Call with results. Her CT scan looked well. It did not identify the palpable nodule on her abdomen. It is likely because it is fatty tissue (lipoma). We can attempt to have a soft tissue ultrasound to confirm if she would like. Her CT did show fatty liver and fatty pancreas which is due to poor diet, poor glycemic control but nothing worrisome. Did she receive her xifaxan from the pharmacy?    Order signed for soft tissue US.  RUBENS

## 2025-04-15 NOTE — TELEPHONE ENCOUNTER
S/W pts daughter and she was very upset and rude. She stated that how could the nodule on her moms abdomen be from poor diet and glycemic control when all her mom does is eat healthy like fruits & 90% ground meat. She stated that her mom only get popeyes once a week bc she likes it & she's refuses to cut her off from it. The daughter stated she would talk with her mom to decide if she wants to do a soft tissue US and call bck the office tomorrow. -BETO

## 2025-04-17 NOTE — TELEPHONE ENCOUNTER
Fatty liver/fatty pancreas is due to poor glycemic control. Not lipoma. She has an karina with [facility] 5/1/2025. She can discuss further at that time.

## 2025-04-17 NOTE — TELEPHONE ENCOUNTER
Celeste Machado Staff  Caller: SAI MANZANARES [[id number]] (Today, 12:04 PM)  ..Type:  Patient Requesting Call    Who Called: SAI MANZANARES [[id number]]  What is the call regarding?: pt is calling to know what the office care plan is. She would like you to call her back around 2-3pm when she is on lunch.  Would the patient rather a call back or a response via [facility]?  call  Best Call Back Number: [phone]  Additional Information:

## 2025-06-17 ENCOUNTER — TELEPHONE (OUTPATIENT)
Dept: GASTROENTEROLOGY | Facility: CLINIC | Age: 60
End: 2025-06-17
Payer: COMMERCIAL

## 2025-06-17 DIAGNOSIS — K58.0 IRRITABLE BOWEL SYNDROME WITH DIARRHEA: Primary | ICD-10-CM

## 2025-06-17 NOTE — TELEPHONE ENCOUNTER
Please see CRM message below. Patient is requesting XIFAXAN from April to be sent to [facility] because [facility] could not fill it. Pharmacy changed. -RACHEL,LPN

## 2025-06-17 NOTE — TELEPHONE ENCOUNTER
CRM # [id number]  Owner: None  Status: Unresolved  Open  Priority: Routine Created on: 06/16/2025 04:18 PM By: Elizabeth Cage     Primary Information    Source   Leandra Kelley (Patient)    Subject   Leandra Kelley (Patient)    Topic   Medications - Medication Refill        Summary   Medication refill request   Communication   Type:  RX Refill Request            Who Called: Sharri/[employer] Coding Plan      Refill or New Rx:      RX Name and Strength:Rifaxamin      How is the patient currently taking it? (ex. 1XDay):      Is this a 30 day or 90 day RX:      [facility] - [phone] fax [phone]      Local or Mail Order: mail order      Ordering Provider: Twin/NP      Would the patient rather a call back or a response via [facility]? Call back      Best Call Back Number:[phone]      Additional Information: Please submit the RX for this patient to the [facility] as the other pharmacy could not get the medication. Please calll back [phone]. Thanks.

## 2025-07-03 ENCOUNTER — OFFICE VISIT (OUTPATIENT)
Dept: GASTROENTEROLOGY | Facility: CLINIC | Age: 60
End: 2025-07-03
Payer: COMMERCIAL

## 2025-07-03 VITALS
WEIGHT: 232.38 LBS | DIASTOLIC BLOOD PRESSURE: 74 MMHG | HEIGHT: 63 IN | OXYGEN SATURATION: 97 % | BODY MASS INDEX: 41.18 KG/M2 | HEART RATE: 100 BPM | SYSTOLIC BLOOD PRESSURE: 125 MMHG

## 2025-07-03 DIAGNOSIS — Z85.038 HISTORY OF COLON CANCER: ICD-10-CM

## 2025-07-03 DIAGNOSIS — K86.81 EXOCRINE PANCREATIC INSUFFICIENCY: ICD-10-CM

## 2025-07-03 DIAGNOSIS — R19.7 DIARRHEA, UNSPECIFIED TYPE: ICD-10-CM

## 2025-07-03 DIAGNOSIS — K31.84 GASTROPARESIS: ICD-10-CM

## 2025-07-03 DIAGNOSIS — Z86.0100 HISTORY OF COLON POLYPS: ICD-10-CM

## 2025-07-03 DIAGNOSIS — K58.0 IRRITABLE BOWEL SYNDROME WITH DIARRHEA: Primary | ICD-10-CM

## 2025-07-03 PROCEDURE — 99213 OFFICE O/P EST LOW 20 MIN: CPT | Mod: S$PBB,,, | Performed by: INTERNAL MEDICINE

## 2025-07-03 RX ORDER — DULOXETIN HYDROCHLORIDE 30 MG/1
CAPSULE, DELAYED RELEASE ORAL
COMMUNITY
Start: 2025-06-30

## 2025-07-03 RX ORDER — PEN NEEDLE, DIABETIC 30 GX3/16"
NEEDLE, DISPOSABLE MISCELLANEOUS
COMMUNITY

## 2025-07-03 NOTE — LETTER
July 3, 2025        Marta Leonard MD  [address]  [place] [address]             [facility]  [address] [address]  Phone: [phone]  Fax: [phone]   Patient: Leandra Kelley   MR Number: [id number]   YOB: 1965   Date of Visit: 7/3/2025       Dear Dr. Leonard:    Thank you for referring Leandra Kelley to me for evaluation. Attached you will find relevant portions of my assessment and plan of care.    If you have questions, please do not hesitate to call me. I look forward to following Leandra Kelley along with you.    Sincerely,      Neida Murcia MD            CC  No Recipients    Enclosure

## 2025-07-03 NOTE — PATIENT INSTRUCTIONS
Continue taking Creon 2 capsules with each meal and 1 capsule with each snack.   Take Xifaxan 550 mg three times daily for 14 days.

## 2025-07-03 NOTE — PROGRESS NOTES
Clinic Note    Reason for visit:  The primary encounter diagnosis was Irritable bowel syndrome with diarrhea. Diagnoses of Exocrine pancreatic insufficiency, Gastroparesis, Diarrhea, unspecified type, History of colon cancer, and History of colon polyps were also pertinent to this visit.    PCP: Marta Leonard       HPI:  This is a 59 y.o. female here for follow up. Here with . Patient with diarrhea, EPI. On Creon which does seem to help some. She was given course of Xifaxan last OV but [facility] said they do not carry it so wasn't able to get it. Was having 25-30 BMs daily and now having 5-6 BMs daily. May get worse depending on nerves. Occasional incontinence episodes.   Has been trying to follow low FODMAP diet. She has not been taking Imodium. No blood in stool.. Takes Nexium as needed.  Last visit had felt lump in LUQ. CT did not show anything in LUQ. Likely lipoma.    She was referred to [facility] surveillance clinic last year. She did not receive a phone call.     Getting labs for Dr. Leonard today at [facility].    CT Abd w/ 4/10/2025: fatty liver, peter w/ expected biliary ectasia, fatty atrophy. No suspicious abdominal lymphadenopathy. Small midline ventral fat-containing hernia.      2/14/2025 A1C 10.5H CBC-nl  7/9/2024 A1C 11.3H  4/25/2024 Parasite- neg. Calpro, fat-NL. Elastase 153L   3/12/2024 Glucose 332H, 20/22/117H/1.14, CMP ownl, CBC/celiac/TSH nl.   1/12/2024 Hgb A1C 11.3%H     EGD/Colonoscopy 2/28/2024 small HH. Ileocolonic anastomosis in R colon. Diverticulosis. DBx non-sp ditis w/focal villi blunting, GBx wnl w/o Hp, 1 TA, 2 hyp, diverticulosis repeat colonoscopy in 3 years.      9/15/2023 CT A/P W: Normal-peter, hepatic steatosis, normal pancreas, diverticula.  9/10/2019 GES 4hr GES at lower limit normal (89.8%-4hour)     H/o of large serrated adenoma requiring surgical resection in 2016. 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    Review of Systems   Constitutional:   Negative for fatigue, fever and unexpected weight change.   HENT:  Positive for postnasal drip. Negative for mouth sores, sore throat and trouble swallowing.    Eyes:  Positive for discharge. Negative for pain and eye dryness.   Respiratory:  Positive for apnea. Negative for cough, choking, chest tightness, shortness of breath and wheezing.    Cardiovascular:  Negative for chest pain, palpitations and leg swelling.   Gastrointestinal:  Negative for abdominal distention, abdominal pain, anal bleeding, blood in stool, change in bowel habit, constipation, diarrhea, nausea, rectal pain, vomiting, reflux and fecal incontinence.   Genitourinary:  Negative for bladder incontinence, dysuria and hematuria.   Musculoskeletal:  Negative for arthralgias, back pain and joint swelling.   Integumentary:  Negative for color change and rash.   Allergic/Immunologic: Negative for environmental allergies and food allergies.   Neurological:  Negative for seizures and headaches.   Hematological:  Negative for adenopathy. Does not bruise/bleed easily.        Past Medical History:   Diagnosis Date    Anxiety disorder     Benign essential HTN     BMI 40.0-44.9, adult     Chronic diarrhea     CVA (cerebral vascular accident)     DM (diabetes mellitus), type 2     Endometriosis     History of colon cancer     Hypercalcemia     Hyperlipidemia     IBS (irritable bowel syndrome)     Left inguinal hernia     Obstructive sleep apnea     Osteoporosis     RLS (restless legs syndrome)     Steatosis of liver     TIA (transient ischemic attack)     last     Vitamin D deficiency      Past Surgical History:   Procedure Laterality Date     SECTION      CHOLECYSTECTOMY      dr [place]    RIGHT HEMICOLECTOMY  2016     Family History   Problem Relation Name Age of Onset    Diabetes Mother      Heart disease Father      Colon cancer Neg Hx      Esophageal cancer Neg Hx      Rectal cancer Neg Hx      Stomach cancer Neg Hx      Ulcerative colitis  "Neg Hx      Crohn's disease Neg Hx      Celiac disease Neg Hx      Liver disease Neg Hx      Pancreatic cancer Neg Hx      Throat cancer Neg Hx       Social History[1]  Review of patient's allergies indicates:   Allergen Reactions    Adhesive Rash     Cause skin to break out in rash and really bad bleeding bumps        Medication List with Changes/Refills   New Medications    RIFAXIMIN (XIFAXAN) 550 MG TAB    Take 1 tablet (550 mg total) by mouth 3 (three) times daily. for 14 days   Current Medications    ALPRAZOLAM (XANAX) 0.5 MG TABLET    TK 1 T PO Q 12 H PRN    ASPIRIN (ECOTRIN) 81 MG EC TABLET    Take 1 tablet by mouth once daily.    ATORVASTATIN (LIPITOR) 40 MG TABLET    atorvastatin 40 mg tablet    CYCLOBENZAPRINE (FLEXERIL) 10 MG TABLET    Take 1 tablet by mouth.    DULOXETINE (CYMBALTA) 30 MG CAPSULE        ESOMEPRAZOLE (NEXIUM) 40 MG CAPSULE    TK ONE C BY MOUTH BID    GABAPENTIN (NEURONTIN) 600 MG TABLET    Take 300 mg by mouth every evening.    GLIPIZIDE (GLUCOTROL) 10 MG TABLET    TAKE 1 TABLET BY MOUTH TWICE DAILY    HYDROCHLOROTHIAZIDE (HYDRODIURIL) 25 MG TABLET    Take 1 tablet (25 mg total) by mouth once daily.    LIPASE-PROTEASE-AMYLASE (CREON) 36,000-114,000- 180,000 UNIT CPDR    Take 2 capsules with first bite/sip of meals, take 1 capsule with first bite/sip of snacks    PEN NEEDLE, DIABETIC (INSUPEN PEN NEEDLE) 32 GAUGE X 5/32" NDLE        PIOGLITAZONE (ACTOS) 45 MG TABLET    Take 1 tablet by mouth once daily.    PROMETHAZINE (PHENERGAN) 12.5 MG TAB    TAKE 1 TABLET BY MOUTH FOUR TIMES DAILY AS NEEDED FOR NAUSEA AND VOMITING    VALSARTAN (DIOVAN) 320 MG TABLET    TK 1 T PO QD   Discontinued Medications    COLESEVELAM (WELCHOL) 625 MG TABLET    Take 1,875 mg by mouth 2 (two) times daily with meals.    DICYCLOMINE (BENTYL) 20 MG TABLET    TAKE 1 TABLET BY MOUTH FOUR TIMES A DAY AS NEEDED FOR ABDOMINAL CRAMPS    RIFAXIMIN (XIFAXAN) 550 MG TAB    Take 1 tablet (550 mg total) by mouth 3 (three) times " "daily. for 14 days         Vital Signs:  /74 (BP Location: Left arm, Patient Position: Sitting)   Pulse 100   Ht 5' 3" (1.6 m)   Wt 105.4 kg (232 lb 6.4 oz)   SpO2 97%   BMI 41.17 kg/m²         Physical Exam  Vitals reviewed.   Constitutional:       General: She is awake. She is not in acute distress.     Appearance: Normal appearance. She is well-developed. She is obese. She is not ill-appearing, toxic-appearing or diaphoretic.   HENT:      Head: Normocephalic and atraumatic.      Nose: Nose normal.      Mouth/Throat:      Mouth: Mucous membranes are moist.      Pharynx: Oropharynx is clear. No oropharyngeal exudate or posterior oropharyngeal erythema.   Eyes:      General: Lids are normal. Gaze aligned appropriately. No scleral icterus.        Right eye: No discharge.         Left eye: No discharge.      Conjunctiva/sclera: Conjunctivae normal.   Neck:      Trachea: Trachea normal.   Cardiovascular:      Rate and Rhythm: Normal rate and regular rhythm.      Pulses:           Radial pulses are 2+ on the right side and 2+ on the left side.   Pulmonary:      Effort: Pulmonary effort is normal. No respiratory distress.      Breath sounds: No stridor. No wheezing.   Chest:      Chest wall: No tenderness.   Abdominal:      General: Bowel sounds are normal. There is no distension.      Palpations: Abdomen is soft. There is no fluid wave, hepatomegaly or mass.      Tenderness: There is no abdominal tenderness. There is no guarding or rebound.   Musculoskeletal:         General: No tenderness or deformity.      Cervical back: No tenderness.      Right lower leg: No edema.      Left lower leg: No edema.   Lymphadenopathy:      Cervical: No cervical adenopathy.   Skin:     General: Skin is warm and dry.      Capillary Refill: Capillary refill takes less than 2 seconds.      Coloration: Skin is not cyanotic, jaundiced or pale.   Neurological:      General: No focal deficit present.      Mental Status: She is alert " and oriented to person, place, and time.      Motor: No tremor.   Psychiatric:         Attention and Perception: Attention normal.         Mood and Affect: Mood and affect normal.         Speech: Speech normal.         Behavior: Behavior normal. Behavior is cooperative.            All of the data above and below has been reviewed by myself and any further interpretations will be reflected in the assessment and plan.   The data includes review of external notes, and independent interpretation of lab results, procedures, x-rays, and imaging reports.      Assessment:  Irritable bowel syndrome with diarrhea  -     rifAXIMin (XIFAXAN) 550 mg Tab; Take 1 tablet (550 mg total) by mouth 3 (three) times daily. for 14 days  Dispense: 42 tablet; Refill: 0    Exocrine pancreatic insufficiency    Gastroparesis    Diarrhea, unspecified type    History of colon cancer    History of colon polyps      EPI- on Creon which helps. Given course of Xifaxan 4/2025 but was not able to get it.   Diarrhea, Creon helps.   Hx CRC/Hx colon polyps- colonoscopy due 2/2027     Recommendations:    Continue taking Creon 2 capsules with each meal and 1 capsule with each snack.   Take Xifaxan 550 mg three times daily for 14 days. Get copay card from  website.     If any tests, procedures, or imaging has been ordered and you are not contacted to schedule within 1-2 weeks, then you may call the central scheduling department directly at [phone].     Risks, benefits, and alternatives of medical management, any associated procedures, and/or treatment discussed with the patient. Patient given opportunity to ask questions and voices understanding. Patient has elected to proceed with the recommended care modalities as discussed.    Instructed patient to notify my office if they have not been contacted within two weeks after any procedures, submitting any samples (biopsies, blood, stool, urine, etc.) or after any imaging (X-ray, CT, MRI,  etc.).     Follow up in about 1 year (around 7/3/2026).    Order summary:  No orders of the defined types were placed in this encounter.     This assessment, plan, and documentation was performed in collaboration with Isabelle Miller NP.     This document may have been created using a voice recognition transcribing system. Incorrect words or phrases may have been missed during proofreading. Please interpret accordingly or contact me for clarification.     Neida Murcia MD         [1]   Social History  Tobacco Use    Smoking status: Never    Smokeless tobacco: Never   Substance Use Topics    Alcohol use: Never    Drug use: Yes     Types: Benzodiazepines